# Patient Record
Sex: FEMALE | Race: WHITE | Employment: OTHER | URBAN - METROPOLITAN AREA
[De-identification: names, ages, dates, MRNs, and addresses within clinical notes are randomized per-mention and may not be internally consistent; named-entity substitution may affect disease eponyms.]

---

## 2017-01-04 ENCOUNTER — APPOINTMENT (OUTPATIENT)
Dept: LAB | Age: 72
End: 2017-01-04
Attending: INTERNAL MEDICINE
Payer: MEDICARE

## 2017-01-04 DIAGNOSIS — E03.9 HYPOTHYROIDISM, UNSPECIFIED TYPE: ICD-10-CM

## 2017-01-04 DIAGNOSIS — E55.9 VITAMIN D DEFICIENCY: ICD-10-CM

## 2017-01-04 LAB
T4 FREE SERPL-MCNC: 1.44 NG/DL (ref 0.58–1.64)
TSH SERPL-ACNC: 3.11 UIU/ML (ref 0.34–5.6)

## 2017-01-04 PROCEDURE — 36415 COLL VENOUS BLD VENIPUNCTURE: CPT

## 2017-01-04 PROCEDURE — 84443 ASSAY THYROID STIM HORMONE: CPT

## 2017-01-04 PROCEDURE — 84439 ASSAY OF FREE THYROXINE: CPT

## 2017-01-04 PROCEDURE — 82306 VITAMIN D 25 HYDROXY: CPT

## 2017-01-06 LAB — 25(OH)D3 SERPL-MCNC: 27.3 NG/ML

## 2017-01-10 ENCOUNTER — APPOINTMENT (OUTPATIENT)
Dept: LAB | Age: 72
End: 2017-01-10
Attending: FAMILY MEDICINE
Payer: MEDICARE

## 2017-01-10 PROCEDURE — 36415 COLL VENOUS BLD VENIPUNCTURE: CPT | Performed by: FAMILY MEDICINE

## 2017-01-10 PROCEDURE — 80053 COMPREHEN METABOLIC PANEL: CPT | Performed by: FAMILY MEDICINE

## 2017-01-10 PROCEDURE — 83036 HEMOGLOBIN GLYCOSYLATED A1C: CPT | Performed by: FAMILY MEDICINE

## 2017-01-10 PROCEDURE — 80061 LIPID PANEL: CPT | Performed by: FAMILY MEDICINE

## 2017-01-10 PROCEDURE — 84443 ASSAY THYROID STIM HORMONE: CPT | Performed by: FAMILY MEDICINE

## 2017-01-10 PROCEDURE — 82570 ASSAY OF URINE CREATININE: CPT | Performed by: FAMILY MEDICINE

## 2017-01-10 PROCEDURE — 82043 UR ALBUMIN QUANTITATIVE: CPT | Performed by: FAMILY MEDICINE

## 2017-01-12 ENCOUNTER — OFFICE VISIT (OUTPATIENT)
Dept: FAMILY MEDICINE CLINIC | Facility: CLINIC | Age: 72
End: 2017-01-12

## 2017-01-12 VITALS
DIASTOLIC BLOOD PRESSURE: 84 MMHG | SYSTOLIC BLOOD PRESSURE: 170 MMHG | WEIGHT: 166 LBS | BODY MASS INDEX: 27 KG/M2 | HEART RATE: 73 BPM

## 2017-01-12 DIAGNOSIS — E11.9 TYPE 2 DIABETES MELLITUS WITHOUT COMPLICATION, WITHOUT LONG-TERM CURRENT USE OF INSULIN (HCC): Primary | ICD-10-CM

## 2017-01-12 DIAGNOSIS — E78.5 HYPERLIPIDEMIA, UNSPECIFIED HYPERLIPIDEMIA TYPE: ICD-10-CM

## 2017-01-12 DIAGNOSIS — F32.A DEPRESSION, UNSPECIFIED DEPRESSION TYPE: ICD-10-CM

## 2017-01-12 PROCEDURE — 90732 PPSV23 VACC 2 YRS+ SUBQ/IM: CPT | Performed by: FAMILY MEDICINE

## 2017-01-12 PROCEDURE — G0463 HOSPITAL OUTPT CLINIC VISIT: HCPCS | Performed by: FAMILY MEDICINE

## 2017-01-12 PROCEDURE — 99214 OFFICE O/P EST MOD 30 MIN: CPT | Performed by: FAMILY MEDICINE

## 2017-01-12 PROCEDURE — G0009 ADMIN PNEUMOCOCCAL VACCINE: HCPCS | Performed by: FAMILY MEDICINE

## 2017-01-12 RX ORDER — ESCITALOPRAM OXALATE 10 MG/1
20 TABLET ORAL DAILY
Qty: 60 TABLET | Refills: 5 | Status: SHIPPED | OUTPATIENT
Start: 2017-01-12 | End: 2017-01-22

## 2017-01-12 RX ORDER — PRAVASTATIN SODIUM 80 MG/1
80 TABLET ORAL NIGHTLY
Qty: 90 TABLET | Refills: 3 | Status: SHIPPED | OUTPATIENT
Start: 2017-01-12 | End: 2017-12-15

## 2017-01-12 RX ORDER — CHOLECALCIFEROL (VITAMIN D3) 50 MCG
2000 TABLET ORAL DAILY
Qty: 90 TABLET | Refills: 3 | Status: SHIPPED | OUTPATIENT
Start: 2017-01-12 | End: 2017-02-11

## 2017-01-12 NOTE — PROGRESS NOTES
HPI:    Patient ID: Axel Mace is a 80year old female. Diabetes  She presents for her follow-up diabetic visit. She has type 2 diabetes mellitus. There are no hypoglycemic associated symptoms.  Pertinent negatives for diabetes include no blurred v monofilament testing bilateral feet   Skin: Skin is warm and dry.               ASSESSMENT/PLAN:   Type 2 diabetes mellitus without complication, without long-term current use of insulin (hcc)  (primary encounter diagnosis)  Hyperlipidemia, unspecified hype

## 2017-01-19 ENCOUNTER — PATIENT MESSAGE (OUTPATIENT)
Dept: FAMILY MEDICINE CLINIC | Facility: CLINIC | Age: 72
End: 2017-01-19

## 2017-01-21 NOTE — TELEPHONE ENCOUNTER
From: Ignacio Brown  To: Sterling Andrade MD  Sent: 1/19/2017 11:58 AM CST  Subject: Prescription Question    Hi Dr. Rama Chang: You were going to change my depression medicine but must have forgotten. Could you please call it in to [facility] at [phone]?  Than

## 2017-01-22 RX ORDER — ESCITALOPRAM OXALATE 20 MG/1
20 TABLET ORAL DAILY
Qty: 30 TABLET | Refills: 2 | Status: SHIPPED | OUTPATIENT
Start: 2017-01-22 | End: 2017-04-20

## 2017-03-15 ENCOUNTER — OFFICE VISIT (OUTPATIENT)
Dept: ENDOCRINOLOGY CLINIC | Facility: CLINIC | Age: 72
End: 2017-03-15

## 2017-03-15 ENCOUNTER — TELEPHONE (OUTPATIENT)
Dept: ENDOCRINOLOGY CLINIC | Facility: CLINIC | Age: 72
End: 2017-03-15

## 2017-03-15 VITALS
HEIGHT: 66 IN | HEART RATE: 85 BPM | BODY MASS INDEX: 26.39 KG/M2 | DIASTOLIC BLOOD PRESSURE: 80 MMHG | SYSTOLIC BLOOD PRESSURE: 142 MMHG | WEIGHT: 164.19 LBS

## 2017-03-15 DIAGNOSIS — E03.9 HYPOTHYROIDISM, UNSPECIFIED TYPE: Primary | ICD-10-CM

## 2017-03-15 DIAGNOSIS — M85.80 OSTEOPENIA, UNSPECIFIED LOCATION: ICD-10-CM

## 2017-03-15 PROCEDURE — 99213 OFFICE O/P EST LOW 20 MIN: CPT | Performed by: INTERNAL MEDICINE

## 2017-03-15 PROCEDURE — G0463 HOSPITAL OUTPT CLINIC VISIT: HCPCS | Performed by: INTERNAL MEDICINE

## 2017-03-15 NOTE — TELEPHONE ENCOUNTER
Pt called back and stated that the dosage she is taking is 112 mcg per their conversation this morning with Dr Marcela Guerrero.  Thank you        Current outpatient prescriptions:   •  Levothyroxine Sodium 112 MCG Oral Tab, Take 1 tablet (112 mcg total) by mouth

## 2017-03-15 NOTE — PROGRESS NOTES
Return Office Visit - Diabetes    CHIEF COMPLAINT:    Hypothyroidism  Osteopenia    HISTORY OF PRESENT ILLNESS:   Farrukh Escobar is a 80year old female who presents for follow up for hypothyroidism, osteopenia.      She was diagnosed with hyperthyroidism bleeding  Neurology: Negative for: headache, numbness, weakness  Genito-Urinary: Negative for: dysuria, frequency  Psychiatric: Negative for:  depression, anxiety  Hematology/Lymphatics: Negative for: bruising, lower extremity edema  Endocrine: Negative fo especially calcium, iron, multivitamins containing Ca/iron  Stressed the importance of compliance with meds  Side effects of noncompliance including the development of myxedema coma and death discussed    Bone health:  25 OH vitamin D is normal. She is on

## 2017-03-17 RX ORDER — LEVOTHYROXINE SODIUM 112 UG/1
112 TABLET ORAL
Qty: 180 TABLET | Refills: 1 | Status: SHIPPED | OUTPATIENT
Start: 2017-03-17 | End: 2018-09-04

## 2017-03-17 NOTE — TELEPHONE ENCOUNTER
Patient was confused at the visit. Hence I had asked her to call back. I filled for the 112 mcg daily.   Thx.

## 2017-03-20 RX ORDER — LEVOTHYROXINE SODIUM 112 UG/1
TABLET ORAL
Qty: 90 TABLET | Refills: 0 | Status: SHIPPED | OUTPATIENT
Start: 2017-03-20 | End: 2017-11-02

## 2017-04-20 RX ORDER — ESCITALOPRAM OXALATE 20 MG/1
TABLET ORAL
Qty: 30 TABLET | Refills: 11 | Status: SHIPPED | OUTPATIENT
Start: 2017-04-20 | End: 2018-03-24

## 2017-06-23 ENCOUNTER — PATIENT MESSAGE (OUTPATIENT)
Dept: FAMILY MEDICINE CLINIC | Facility: CLINIC | Age: 72
End: 2017-06-23

## 2017-06-24 NOTE — TELEPHONE ENCOUNTER
From: Chet Frost  To: Miko Stoner MD  Sent: 6/23/2017 7:33 PM CDT  Subject: Other    Dear Dr. Gissell Lockwood: I was due for a mammogram in May and I am not due to see you until July so could you send me an order for one, either by mail or e-mail? Thanks.

## 2017-06-25 ENCOUNTER — PATIENT MESSAGE (OUTPATIENT)
Dept: FAMILY MEDICINE CLINIC | Facility: CLINIC | Age: 72
End: 2017-06-25

## 2017-06-25 DIAGNOSIS — Z12.31 VISIT FOR SCREENING MAMMOGRAM: ICD-10-CM

## 2017-06-25 DIAGNOSIS — Z12.39 BREAST CANCER SCREENING, HIGH RISK PATIENT: Primary | ICD-10-CM

## 2017-06-27 NOTE — TELEPHONE ENCOUNTER
From: Susanne Gonzalez  To: Mauricio Reynolds MD  Sent: 6/25/2017 11:09 PM CDT  Subject: Non-Urgent Medical Question    Attn: Felicita Rios RN I get my mammograms at the [facility].  Would they be able to access this information from your compute

## 2017-06-27 NOTE — TELEPHONE ENCOUNTER
Please see [employer] message from 17 below. Patient's last Mammogram order  2017. LOV 17. Please advise.

## 2017-06-28 ENCOUNTER — PATIENT MESSAGE (OUTPATIENT)
Dept: FAMILY MEDICINE CLINIC | Facility: CLINIC | Age: 72
End: 2017-06-28

## 2017-06-29 NOTE — TELEPHONE ENCOUNTER
From: Melissa Quarles  To: Veronica Anderson MD  Sent: 6/28/2017 4:16 PM CDT  Subject: Other    Dear Dr. Miguel Hernandes, the mammogram order doesn't print. Could you please mail it to me at [address] [place] [address]? Thanks!  Zenaida Pretty

## 2017-07-15 ENCOUNTER — TELEPHONE (OUTPATIENT)
Dept: FAMILY MEDICINE CLINIC | Facility: CLINIC | Age: 72
End: 2017-07-15

## 2017-07-21 ENCOUNTER — PATIENT OUTREACH (OUTPATIENT)
Dept: FAMILY MEDICINE CLINIC | Facility: CLINIC | Age: 72
End: 2017-07-21

## 2017-07-21 DIAGNOSIS — E11.9 CONTROLLED TYPE 2 DIABETES MELLITUS WITHOUT COMPLICATION, WITHOUT LONG-TERM CURRENT USE OF INSULIN (HCC): Primary | ICD-10-CM

## 2017-07-21 NOTE — PROGRESS NOTES
Received request as CM to please order any labs prior to appt. Pt had slightly high Lipid panel and was due for another A1c. Both orders placed and pt informed she needs to be fasting with only water to drink 10-12 hrs prior to testing.  Stated understandin

## 2017-07-27 ENCOUNTER — APPOINTMENT (OUTPATIENT)
Dept: LAB | Age: 72
End: 2017-07-27
Attending: FAMILY MEDICINE
Payer: MEDICARE

## 2017-07-27 DIAGNOSIS — E11.9 CONTROLLED TYPE 2 DIABETES MELLITUS WITHOUT COMPLICATION, WITHOUT LONG-TERM CURRENT USE OF INSULIN (HCC): ICD-10-CM

## 2017-07-27 LAB
CHOLEST SERPL-MCNC: 196 MG/DL (ref 110–200)
HDLC SERPL-MCNC: 66 MG/DL
LDLC SERPL CALC-MCNC: 104 MG/DL (ref 0–99)
NONHDLC SERPL-MCNC: 130 MG/DL
TRIGL SERPL-MCNC: 128 MG/DL (ref 1–149)

## 2017-07-27 PROCEDURE — 36415 COLL VENOUS BLD VENIPUNCTURE: CPT

## 2017-07-27 PROCEDURE — 83036 HEMOGLOBIN GLYCOSYLATED A1C: CPT

## 2017-07-27 PROCEDURE — 80061 LIPID PANEL: CPT

## 2017-07-28 LAB — HBA1C MFR BLD: 5.7 % (ref 4–6)

## 2017-07-31 ENCOUNTER — OFFICE VISIT (OUTPATIENT)
Dept: FAMILY MEDICINE CLINIC | Facility: CLINIC | Age: 72
End: 2017-07-31

## 2017-07-31 VITALS
WEIGHT: 165 LBS | HEART RATE: 75 BPM | DIASTOLIC BLOOD PRESSURE: 74 MMHG | TEMPERATURE: 98 F | RESPIRATION RATE: 16 BRPM | SYSTOLIC BLOOD PRESSURE: 124 MMHG | BODY MASS INDEX: 30.36 KG/M2 | HEIGHT: 62 IN

## 2017-07-31 DIAGNOSIS — M79.10 MYALGIA: ICD-10-CM

## 2017-07-31 DIAGNOSIS — E78.5 HYPERLIPIDEMIA, UNSPECIFIED HYPERLIPIDEMIA TYPE: ICD-10-CM

## 2017-07-31 DIAGNOSIS — E11.9 CONTROLLED TYPE 2 DIABETES MELLITUS WITHOUT COMPLICATION, WITHOUT LONG-TERM CURRENT USE OF INSULIN (HCC): Primary | ICD-10-CM

## 2017-07-31 PROCEDURE — 99214 OFFICE O/P EST MOD 30 MIN: CPT | Performed by: FAMILY MEDICINE

## 2017-07-31 PROCEDURE — G0463 HOSPITAL OUTPT CLINIC VISIT: HCPCS | Performed by: FAMILY MEDICINE

## 2017-07-31 NOTE — PROGRESS NOTES
HPI:    Patient ID: Melissa Quarles is a 71year old female. Diabetes   She presents for her follow-up diabetic visit. She has type 2 diabetes mellitus. Her disease course has been stable. There are no hypoglycemic associated symptoms.  Pertinent negati Lymphadenopathy:     She has no cervical adenopathy. Neurological: She is alert and oriented to person, place, and time. Skin: Skin is warm and dry.               ASSESSMENT/PLAN:   Controlled type 2 diabetes mellitus without complication, without malean

## 2017-08-22 ENCOUNTER — PATIENT MESSAGE (OUTPATIENT)
Dept: FAMILY MEDICINE CLINIC | Facility: CLINIC | Age: 72
End: 2017-08-22

## 2017-08-26 ENCOUNTER — NURSE TRIAGE (OUTPATIENT)
Dept: OTHER | Age: 72
End: 2017-08-26

## 2017-08-26 DIAGNOSIS — M25.552 BILATERAL HIP PAIN: ICD-10-CM

## 2017-08-26 DIAGNOSIS — M79.652 BILATERAL THIGH PAIN: ICD-10-CM

## 2017-08-26 DIAGNOSIS — M79.10 PAIN IN THE MUSCLES: Primary | ICD-10-CM

## 2017-08-26 DIAGNOSIS — M79.651 BILATERAL THIGH PAIN: ICD-10-CM

## 2017-08-26 DIAGNOSIS — M25.551 BILATERAL HIP PAIN: ICD-10-CM

## 2017-08-26 NOTE — TELEPHONE ENCOUNTER
Please see patient E-mail and advise. 7/31/17 office notes:  Plan to hold pravastatin for 3 weeks at that time contact us with update.   If no change in pain and to check bilateral hip x-rays and MRI lumbar spine to rule out hip arthritis, lumbar stenos

## 2017-08-26 NOTE — TELEPHONE ENCOUNTER
From: Javi Clay  To:  July Escalona MD  Sent: 8/22/2017  1:04 PM CDT  Subject: Visit Follow-up Question    Dear Dr Manuel Horton: Andreea Bradshaw stopped taking my cholesterol medicine for 3 weeks to see if my muscle pain got better.  It got a little better bu

## 2017-08-26 NOTE — TELEPHONE ENCOUNTER
From: Anthony Hogan  To: Joelle Cobb MD  Sent: 8/22/2017 1:04 PM CDT  Subject: Visit Follow-up Question    Dear Dr Kaycee Cummings: I stopped taking my cholesterol medicine for 3 weeks to see if my muscle pain got better.  It got a little better but is still

## 2017-08-28 NOTE — TELEPHONE ENCOUNTER
Patient confirmed that she is still having the muscle pain even after stopping the pravastatin. Wanted to know next step? Did you want to go ahead with the x-ray and MRI? Please advise.

## 2017-08-28 NOTE — TELEPHONE ENCOUNTER
Advised patient of Dr. Annette Tobin note and number to scheduling given. Patient verbalized understanding.

## 2017-09-05 ENCOUNTER — HOSPITAL ENCOUNTER (OUTPATIENT)
Dept: MRI IMAGING | Age: 72
Discharge: HOME | End: 2017-09-05
Attending: FAMILY MEDICINE
Payer: MEDICARE

## 2017-09-05 ENCOUNTER — HOSPITAL ENCOUNTER (OUTPATIENT)
Dept: GENERAL RADIOLOGY | Age: 72
Discharge: HOME | End: 2017-09-05
Attending: FAMILY MEDICINE
Payer: MEDICARE

## 2017-09-05 DIAGNOSIS — M25.551 BILATERAL HIP PAIN: ICD-10-CM

## 2017-09-05 DIAGNOSIS — M79.10 PAIN IN THE MUSCLES: ICD-10-CM

## 2017-09-05 DIAGNOSIS — M25.552 BILATERAL HIP PAIN: ICD-10-CM

## 2017-09-05 DIAGNOSIS — M79.651 BILATERAL THIGH PAIN: ICD-10-CM

## 2017-09-05 DIAGNOSIS — M79.652 BILATERAL THIGH PAIN: ICD-10-CM

## 2017-09-05 PROCEDURE — 73523 X-RAY EXAM HIPS BI 5/> VIEWS: CPT | Performed by: FAMILY MEDICINE

## 2017-09-05 PROCEDURE — 72148 MRI LUMBAR SPINE W/O DYE: CPT | Performed by: FAMILY MEDICINE

## 2017-09-05 RX ORDER — METFORMIN HYDROCHLORIDE 500 MG/1
TABLET, EXTENDED RELEASE ORAL
Qty: 90 TABLET | Refills: 1 | Status: SHIPPED | OUTPATIENT
Start: 2017-09-05 | End: 2018-02-28

## 2017-09-05 NOTE — TELEPHONE ENCOUNTER
Diabetes Medications chart reviewed, RTC Jan 2018, refilled for 6 months  Protocol Criteria:  · Appointment scheduled in the past 6 months or the next 3 months  · A1C < 7.5 in the past 6 months  · Creatinine in the past 12 months  · Creatinine result < 1.5

## 2017-09-06 DIAGNOSIS — M76.899 HAMSTRING TENDINITIS AT ORIGIN: ICD-10-CM

## 2017-09-06 DIAGNOSIS — M48.061 LUMBAR STENOSIS: ICD-10-CM

## 2017-09-06 DIAGNOSIS — R93.7 ABNORMAL MRI, LUMBAR SPINE: Primary | ICD-10-CM

## 2017-09-14 ENCOUNTER — HOSPITAL ENCOUNTER (OUTPATIENT)
Dept: NUCLEAR MEDICINE | Facility: HOSPITAL | Age: 72
Discharge: HOME | End: 2017-09-14
Attending: FAMILY MEDICINE
Payer: MEDICARE

## 2017-09-14 DIAGNOSIS — R93.7 ABNORMAL MRI, LUMBAR SPINE: ICD-10-CM

## 2017-09-14 PROCEDURE — 78320 NM BONE SCAN WITH SPECT  (CPT=78306/78320): CPT | Performed by: FAMILY MEDICINE

## 2017-09-14 PROCEDURE — 78306 BONE IMAGING WHOLE BODY: CPT | Performed by: FAMILY MEDICINE

## 2017-09-18 ENCOUNTER — HOSPITAL ENCOUNTER (OUTPATIENT)
Dept: GENERAL RADIOLOGY | Age: 72
Discharge: HOME | End: 2017-09-18
Attending: FAMILY MEDICINE
Payer: MEDICARE

## 2017-09-18 DIAGNOSIS — R94.8 ABNORMAL RADIONUCLIDE BONE SCAN: ICD-10-CM

## 2017-09-18 PROCEDURE — 72070 X-RAY EXAM THORAC SPINE 2VWS: CPT | Performed by: FAMILY MEDICINE

## 2017-09-19 ENCOUNTER — TELEPHONE (OUTPATIENT)
Dept: FAMILY MEDICINE CLINIC | Facility: CLINIC | Age: 72
End: 2017-09-19

## 2017-09-19 NOTE — TELEPHONE ENCOUNTER
See 9/6 PT order- Pt requesting physical therapy  at [facility], [place]ria Jay, [place] Tracey  Requesting to mail order to her home address

## 2017-09-20 NOTE — TELEPHONE ENCOUNTER
Spoke with pt and informed pt that order was already placed and a copy will be mailed to her.  Pt voiced understanding

## 2017-10-31 ENCOUNTER — TELEPHONE (OUTPATIENT)
Dept: OTHER | Age: 72
End: 2017-10-31

## 2017-10-31 NOTE — TELEPHONE ENCOUNTER
Pt returned call, states she is receivng physical therapy for back pain, pain worsened last Thursday when pt was shopping. Pain is better today, still having problems getting up from a sitting position. Pt did not got to therapy yesterday.     Appt schedule

## 2017-10-31 NOTE — TELEPHONE ENCOUNTER
----- Message from Mushtaq Chaudhari sent at 10/30/2017  4:54 PM CDT -----  Regarding: Non-Urgent Medical Question  Contact: [phone]  Hi Dr. Mann Davies had about six weeks of  physical therapy and   felt much better.   Thursday I went shopping  an

## 2017-11-02 ENCOUNTER — APPOINTMENT (OUTPATIENT)
Dept: LAB | Age: 72
End: 2017-11-02
Attending: FAMILY MEDICINE
Payer: MEDICARE

## 2017-11-02 ENCOUNTER — OFFICE VISIT (OUTPATIENT)
Dept: FAMILY MEDICINE CLINIC | Facility: CLINIC | Age: 72
End: 2017-11-02

## 2017-11-02 VITALS
DIASTOLIC BLOOD PRESSURE: 72 MMHG | HEART RATE: 86 BPM | TEMPERATURE: 98 F | HEIGHT: 62 IN | WEIGHT: 166.19 LBS | SYSTOLIC BLOOD PRESSURE: 132 MMHG | RESPIRATION RATE: 17 BRPM | BODY MASS INDEX: 30.58 KG/M2

## 2017-11-02 DIAGNOSIS — M48.062 SPINAL STENOSIS OF LUMBAR REGION WITH NEUROGENIC CLAUDICATION: ICD-10-CM

## 2017-11-02 DIAGNOSIS — R26.9 GAIT ABNORMALITY: ICD-10-CM

## 2017-11-02 DIAGNOSIS — M79.10 MYALGIA: Primary | ICD-10-CM

## 2017-11-02 DIAGNOSIS — Z72.89 OTHER PROBLEMS RELATED TO LIFESTYLE: ICD-10-CM

## 2017-11-02 DIAGNOSIS — M79.10 MYALGIA: ICD-10-CM

## 2017-11-02 PROCEDURE — 86140 C-REACTIVE PROTEIN: CPT

## 2017-11-02 PROCEDURE — 36415 COLL VENOUS BLD VENIPUNCTURE: CPT

## 2017-11-02 PROCEDURE — 99214 OFFICE O/P EST MOD 30 MIN: CPT | Performed by: FAMILY MEDICINE

## 2017-11-02 PROCEDURE — 86803 HEPATITIS C AB TEST: CPT

## 2017-11-02 PROCEDURE — 85652 RBC SED RATE AUTOMATED: CPT

## 2017-11-02 PROCEDURE — 99212 OFFICE O/P EST SF 10 MIN: CPT | Performed by: FAMILY MEDICINE

## 2017-11-02 NOTE — PROGRESS NOTES
HPI:    Patient ID: Efrian Houston is a 71year old female. Gait   This is a new problem. The current episode started 1 to 4 weeks ago. The problem occurs intermittently. The problem has been waxing and waning. Associated symptoms include myalgias.  Pe lifestyle  Spinal stenosis of lumbar region with neurogenic claudication     Myalgias–patient complains of bilateral lower extremity pain, aching in buttocks and anterior thighs. Last week she had severe episode while shopping.   She had low back pain with

## 2017-12-04 ENCOUNTER — TELEPHONE (OUTPATIENT)
Dept: FAMILY MEDICINE CLINIC | Facility: CLINIC | Age: 72
End: 2017-12-04

## 2017-12-04 DIAGNOSIS — G91.2 NPH (NORMAL PRESSURE HYDROCEPHALUS) (HCC): Primary | ICD-10-CM

## 2017-12-04 NOTE — TELEPHONE ENCOUNTER
Contacted by Dr. Aline Whelan regarding possible normal pressure hydrocephalus. Patient had borderline ventricle enlargement on MRI. Diagnostic LP would be helpful except patient's symptoms are so intermittent that it would be unlikely to show improvement.   Sh

## 2017-12-15 RX ORDER — PRAVASTATIN SODIUM 80 MG/1
TABLET ORAL
Qty: 90 TABLET | Refills: 0 | Status: SHIPPED | OUTPATIENT
Start: 2017-12-15 | End: 2018-03-28

## 2017-12-21 ENCOUNTER — TELEPHONE (OUTPATIENT)
Dept: FAMILY MEDICINE CLINIC | Facility: CLINIC | Age: 72
End: 2017-12-21

## 2017-12-21 NOTE — TELEPHONE ENCOUNTER
Pt requesting a pre op physical prior to surgery on 1/9/18.  Please advise, thank you     Please reply to pool: RENEE Jeong

## 2017-12-22 NOTE — TELEPHONE ENCOUNTER
Pt calling checking status of call back pt requesting to be added for a pre op prior to surgery date on 01/09. Please advise.      Please reply to pool: RENEE Jeong

## 2017-12-25 ENCOUNTER — PATIENT MESSAGE (OUTPATIENT)
Dept: FAMILY MEDICINE CLINIC | Facility: CLINIC | Age: 72
End: 2017-12-25

## 2017-12-26 NOTE — TELEPHONE ENCOUNTER
Patient is having surgery on Jan 9 th - Need pre op apt asap. Only to see Dr Janet Donnelly, she had been her PCP for over 30 years.          PLEASE REPLY TO EM CALL CENTER

## 2017-12-26 NOTE — TELEPHONE ENCOUNTER
From: Kelton Delatorre  To: Thomas Taylor MD  Sent: 12/25/2017 7:20 PM CST  Subject: Other    Hi Dr. Sonia Hill: [facility] AT [facility] [employer] [facility] you had a great Von! I am having a shunt put in at [facility] on Jan. 9 and I need pre-op physical from you.  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

## 2017-12-27 NOTE — TELEPHONE ENCOUNTER
Pt contacted and given date of 1/2/2018 - this was the first 30 min appt I found that I could schedule pt even in an acute slot - do you think that will be ok with her surgery being on 1/9?

## 2018-01-02 ENCOUNTER — OFFICE VISIT (OUTPATIENT)
Dept: FAMILY MEDICINE CLINIC | Facility: CLINIC | Age: 73
End: 2018-01-02

## 2018-01-02 ENCOUNTER — LAB ENCOUNTER (OUTPATIENT)
Dept: LAB | Age: 73
End: 2018-01-02
Attending: FAMILY MEDICINE
Payer: MEDICARE

## 2018-01-02 VITALS
BODY MASS INDEX: 29.78 KG/M2 | RESPIRATION RATE: 18 BRPM | TEMPERATURE: 98 F | HEIGHT: 62 IN | WEIGHT: 161.81 LBS | DIASTOLIC BLOOD PRESSURE: 84 MMHG | HEART RATE: 80 BPM | SYSTOLIC BLOOD PRESSURE: 138 MMHG

## 2018-01-02 DIAGNOSIS — E11.9 TYPE 2 DIABETES MELLITUS WITHOUT COMPLICATION, WITHOUT LONG-TERM CURRENT USE OF INSULIN (HCC): ICD-10-CM

## 2018-01-02 DIAGNOSIS — Z01.818 PREOPERATIVE GENERAL PHYSICAL EXAMINATION: ICD-10-CM

## 2018-01-02 DIAGNOSIS — Z01.818 PREOPERATIVE GENERAL PHYSICAL EXAMINATION: Primary | ICD-10-CM

## 2018-01-02 DIAGNOSIS — G91.2 NPH (NORMAL PRESSURE HYDROCEPHALUS) (HCC): ICD-10-CM

## 2018-01-02 LAB
ALBUMIN SERPL BCP-MCNC: 4 G/DL (ref 3.5–4.8)
ALBUMIN/GLOB SERPL: 1.6 {RATIO} (ref 1–2)
ALP SERPL-CCNC: 52 U/L (ref 32–100)
ALT SERPL-CCNC: 35 U/L (ref 14–54)
ANION GAP SERPL CALC-SCNC: 8 MMOL/L (ref 0–18)
APTT PPP: 28.2 SECONDS (ref 23.2–35.3)
AST SERPL-CCNC: 24 U/L (ref 15–41)
BASOPHILS # BLD: 0 K/UL (ref 0–0.2)
BASOPHILS NFR BLD: 0 %
BILIRUB SERPL-MCNC: 1 MG/DL (ref 0.3–1.2)
BUN SERPL-MCNC: 18 MG/DL (ref 8–20)
BUN/CREAT SERPL: 28.6 (ref 10–20)
CALCIUM SERPL-MCNC: 9.7 MG/DL (ref 8.5–10.5)
CHLORIDE SERPL-SCNC: 109 MMOL/L (ref 95–110)
CHOLEST SERPL-MCNC: 202 MG/DL (ref 110–200)
CO2 SERPL-SCNC: 24 MMOL/L (ref 22–32)
CREAT SERPL-MCNC: 0.63 MG/DL (ref 0.5–1.5)
CREAT UR-MCNC: 161.3 MG/DL
EOSINOPHIL # BLD: 0.1 K/UL (ref 0–0.7)
EOSINOPHIL NFR BLD: 1 %
ERYTHROCYTE [DISTWIDTH] IN BLOOD BY AUTOMATED COUNT: 13.6 % (ref 11–15)
GLOBULIN PLAS-MCNC: 2.5 G/DL (ref 2.5–3.7)
GLUCOSE SERPL-MCNC: 108 MG/DL (ref 70–99)
HCT VFR BLD AUTO: 46.4 % (ref 35–48)
HDLC SERPL-MCNC: 70 MG/DL
HGB BLD-MCNC: 15.4 G/DL (ref 12–16)
INR BLD: 1 (ref 0.9–1.2)
LDLC SERPL CALC-MCNC: 111 MG/DL (ref 0–99)
LYMPHOCYTES # BLD: 1.6 K/UL (ref 1–4)
LYMPHOCYTES NFR BLD: 31 %
MCH RBC QN AUTO: 30.8 PG (ref 27–32)
MCHC RBC AUTO-ENTMCNC: 33.2 G/DL (ref 32–37)
MCV RBC AUTO: 92.8 FL (ref 80–100)
MICROALBUMIN UR-MCNC: 1.8 MG/DL (ref 0–1.8)
MICROALBUMIN/CREAT UR: 11.2 MG/G{CREAT} (ref 0–20)
MONOCYTES # BLD: 0.4 K/UL (ref 0–1)
MONOCYTES NFR BLD: 8 %
NEUTROPHILS # BLD AUTO: 3 K/UL (ref 1.8–7.7)
NEUTROPHILS NFR BLD: 60 %
NONHDLC SERPL-MCNC: 132 MG/DL
OSMOLALITY UR CALC.SUM OF ELEC: 294 MOSM/KG (ref 275–295)
PLATELET # BLD AUTO: 206 K/UL (ref 140–400)
PMV BLD AUTO: 7.5 FL (ref 7.4–10.3)
POTASSIUM SERPL-SCNC: 3.9 MMOL/L (ref 3.3–5.1)
PROT SERPL-MCNC: 6.5 G/DL (ref 5.9–8.4)
PROTHROMBIN TIME: 13.2 SECONDS (ref 11.8–14.5)
RBC # BLD AUTO: 5 M/UL (ref 3.7–5.4)
SODIUM SERPL-SCNC: 141 MMOL/L (ref 136–144)
TRIGL SERPL-MCNC: 107 MG/DL (ref 1–149)
TSH SERPL-ACNC: 2.35 UIU/ML (ref 0.45–5.33)
WBC # BLD AUTO: 5.1 K/UL (ref 4–11)

## 2018-01-02 PROCEDURE — 80053 COMPREHEN METABOLIC PANEL: CPT

## 2018-01-02 PROCEDURE — 93010 ELECTROCARDIOGRAM REPORT: CPT | Performed by: FAMILY MEDICINE

## 2018-01-02 PROCEDURE — 99214 OFFICE O/P EST MOD 30 MIN: CPT | Performed by: FAMILY MEDICINE

## 2018-01-02 PROCEDURE — 93005 ELECTROCARDIOGRAM TRACING: CPT | Performed by: FAMILY MEDICINE

## 2018-01-02 PROCEDURE — 82043 UR ALBUMIN QUANTITATIVE: CPT

## 2018-01-02 PROCEDURE — 85610 PROTHROMBIN TIME: CPT

## 2018-01-02 PROCEDURE — 80061 LIPID PANEL: CPT

## 2018-01-02 PROCEDURE — 36415 COLL VENOUS BLD VENIPUNCTURE: CPT

## 2018-01-02 PROCEDURE — G0463 HOSPITAL OUTPT CLINIC VISIT: HCPCS | Performed by: FAMILY MEDICINE

## 2018-01-02 PROCEDURE — 85025 COMPLETE CBC W/AUTO DIFF WBC: CPT

## 2018-01-02 PROCEDURE — 85730 THROMBOPLASTIN TIME PARTIAL: CPT

## 2018-01-02 PROCEDURE — 83036 HEMOGLOBIN GLYCOSYLATED A1C: CPT

## 2018-01-02 PROCEDURE — 84443 ASSAY THYROID STIM HORMONE: CPT

## 2018-01-02 PROCEDURE — 82570 ASSAY OF URINE CREATININE: CPT

## 2018-01-02 NOTE — PROGRESS NOTES
HPI:    Patient ID: Cecilia Ahmadi is a 71year old female. patient is scheduled for placement of the CSF shunt at [facility] 1/9/18 with Dr. Ruchi Saavedra        Review of Systems   Constitutional: Positive for fatigue.    Respiratory: pressure hydrocephalus)  Type 2 diabetes mellitus without complication, without long-term current use of insulin (hcc)     Preoperative physical exam–patient is scheduled for placement of the CSF shunt at [facility] 1/9/18 with Dr. David Carson And

## 2018-01-03 LAB — HBA1C MFR BLD: 5.7 % (ref 4–6)

## 2018-01-08 ENCOUNTER — TELEPHONE (OUTPATIENT)
Dept: FAMILY MEDICINE CLINIC | Facility: CLINIC | Age: 73
End: 2018-01-08

## 2018-01-08 NOTE — TELEPHONE ENCOUNTER
Pt calling states she is scheduled for surgery on 01/09 and needs clearance and results of lab work sent to [facility] fax .

## 2018-01-30 ENCOUNTER — OFFICE VISIT (OUTPATIENT)
Dept: FAMILY MEDICINE CLINIC | Facility: CLINIC | Age: 73
End: 2018-01-30

## 2018-01-30 VITALS
WEIGHT: 162.19 LBS | RESPIRATION RATE: 17 BRPM | TEMPERATURE: 98 F | HEIGHT: 62 IN | HEART RATE: 78 BPM | DIASTOLIC BLOOD PRESSURE: 82 MMHG | SYSTOLIC BLOOD PRESSURE: 132 MMHG | BODY MASS INDEX: 29.85 KG/M2

## 2018-01-30 DIAGNOSIS — G91.2 NPH (NORMAL PRESSURE HYDROCEPHALUS) (HCC): ICD-10-CM

## 2018-01-30 DIAGNOSIS — E11.9 CONTROLLED TYPE 2 DIABETES MELLITUS WITHOUT COMPLICATION, WITHOUT LONG-TERM CURRENT USE OF INSULIN (HCC): Primary | ICD-10-CM

## 2018-01-30 PROCEDURE — G0463 HOSPITAL OUTPT CLINIC VISIT: HCPCS | Performed by: FAMILY MEDICINE

## 2018-01-30 PROCEDURE — 99213 OFFICE O/P EST LOW 20 MIN: CPT | Performed by: FAMILY MEDICINE

## 2018-01-30 NOTE — PROGRESS NOTES
HPI:    Patient ID: Amanda Herrera is a 71year old female. Diabetes   She presents for her follow-up diabetic visit. She has type 2 diabetes mellitus. Her disease course has been stable. There are no hypoglycemic associated symptoms.  There are no jesse metformin. Recent hemoglobin A1c 5.9. Plan to continue current medication follow-up here for Medicare physical in 4-5 months. NPH– 3 weeks status post  shunt with Dr. Cy Camargo at [facility].   Patient has noticed improvement in leg weakness, inco

## 2018-02-27 ENCOUNTER — TELEPHONE (OUTPATIENT)
Dept: ENDOCRINOLOGY CLINIC | Facility: CLINIC | Age: 73
End: 2018-02-27

## 2018-02-27 DIAGNOSIS — E03.9 HYPOTHYROIDISM, UNSPECIFIED TYPE: Primary | ICD-10-CM

## 2018-02-27 NOTE — TELEPHONE ENCOUNTER
No labs currently ordered by Dr. Kristie Ceja. Homero Escobedo. Informed her that Dr. Kristie Ceja is on vacation and we will call her back when MD returns.  Patient states she had a TSH on 1/2 ordered by Dr. Abhinav Shirley that was normal. She was unsure if she needed anyt

## 2018-02-28 RX ORDER — METFORMIN HYDROCHLORIDE 500 MG/1
TABLET, EXTENDED RELEASE ORAL
Qty: 90 TABLET | Refills: 3 | Status: SHIPPED | OUTPATIENT
Start: 2018-02-28 | End: 2019-02-15

## 2018-03-05 NOTE — TELEPHONE ENCOUNTER
Patient returned call. Informed her FT4 order placed and patient will go for labs before upcoming appt.

## 2018-03-12 ENCOUNTER — PATIENT MESSAGE (OUTPATIENT)
Dept: FAMILY MEDICINE CLINIC | Facility: CLINIC | Age: 73
End: 2018-03-12

## 2018-03-12 RX ORDER — LOSARTAN POTASSIUM AND HYDROCHLOROTHIAZIDE 12.5; 5 MG/1; MG/1
1 TABLET ORAL DAILY
Qty: 30 TABLET | Refills: 3 | Status: SHIPPED | OUTPATIENT
Start: 2018-03-12 | End: 2018-07-04

## 2018-03-12 NOTE — TELEPHONE ENCOUNTER
From: Woo Ray  To: Maritza Oconnell MD  Sent: 3/12/2018 3:51 PM CDT  Subject: Non-Urgent Medical Question    I have been checking my blood pressure like you asked me to and here are the results.  Please let me know if you think I should take any med

## 2018-03-15 ENCOUNTER — APPOINTMENT (OUTPATIENT)
Dept: LAB | Age: 73
End: 2018-03-15
Attending: INTERNAL MEDICINE
Payer: MEDICARE

## 2018-03-15 DIAGNOSIS — E03.9 HYPOTHYROIDISM, UNSPECIFIED TYPE: ICD-10-CM

## 2018-03-15 LAB — T4 FREE SERPL-MCNC: 1.13 NG/DL (ref 0.58–1.64)

## 2018-03-15 PROCEDURE — 36415 COLL VENOUS BLD VENIPUNCTURE: CPT

## 2018-03-15 PROCEDURE — 84439 ASSAY OF FREE THYROXINE: CPT

## 2018-03-21 ENCOUNTER — OFFICE VISIT (OUTPATIENT)
Dept: ENDOCRINOLOGY CLINIC | Facility: CLINIC | Age: 73
End: 2018-03-21

## 2018-03-21 ENCOUNTER — APPOINTMENT (OUTPATIENT)
Dept: LAB | Age: 73
End: 2018-03-21
Attending: INTERNAL MEDICINE
Payer: MEDICARE

## 2018-03-21 VITALS
BODY MASS INDEX: 30.36 KG/M2 | WEIGHT: 165 LBS | HEART RATE: 80 BPM | DIASTOLIC BLOOD PRESSURE: 84 MMHG | SYSTOLIC BLOOD PRESSURE: 137 MMHG | HEIGHT: 62 IN

## 2018-03-21 DIAGNOSIS — E55.9 VITAMIN D DEFICIENCY: ICD-10-CM

## 2018-03-21 DIAGNOSIS — M81.8 OTHER OSTEOPOROSIS, UNSPECIFIED PATHOLOGICAL FRACTURE PRESENCE: Primary | ICD-10-CM

## 2018-03-21 DIAGNOSIS — E03.9 HYPOTHYROIDISM, UNSPECIFIED TYPE: ICD-10-CM

## 2018-03-21 PROBLEM — E03.8 OTHER SPECIFIED HYPOTHYROIDISM: Status: ACTIVE | Noted: 2018-03-21

## 2018-03-21 PROCEDURE — 36415 COLL VENOUS BLD VENIPUNCTURE: CPT

## 2018-03-21 PROCEDURE — 82306 VITAMIN D 25 HYDROXY: CPT

## 2018-03-21 PROCEDURE — 99213 OFFICE O/P EST LOW 20 MIN: CPT | Performed by: INTERNAL MEDICINE

## 2018-03-21 PROCEDURE — G0463 HOSPITAL OUTPT CLINIC VISIT: HCPCS | Performed by: INTERNAL MEDICINE

## 2018-03-21 RX ORDER — MULTIVIT-MIN/IRON/FOLIC ACID/K 18-600-40
2000 CAPSULE ORAL
COMMUNITY

## 2018-03-21 NOTE — PROGRESS NOTES
Return Office Visit - Diabetes    CHIEF COMPLAINT:    Hypothyroidism  Osteopenia    HISTORY OF PRESENT ILLNESS:   Sumanth Armendariz is a 71year old female who presents for follow up for hypothyroidism, osteopenia.      She was diagnosed with hyperthyroidism dysphonia  Respiratory: Negative for:  dyspnea, cough  Cardiovascular: Negative for:  chest pain, palpitations, orthopnea  GI: Negative for:  abdominal pain, nausea, vomiting, diarrhea, constipation, bleeding  Neurology: Negative for: headache, numbness, w especially calcium, iron, multivitamins containing Ca/iron  Stressed the importance of compliance with meds  Side effects of noncompliance including the development of myxedema coma and death discussed    Bone health:  25 OH vitamin D is normal. She is on

## 2018-03-23 LAB — 25(OH)D3 SERPL-MCNC: 36.5 NG/ML

## 2018-03-24 RX ORDER — ESCITALOPRAM OXALATE 20 MG/1
TABLET ORAL
Qty: 30 TABLET | Refills: 11 | Status: SHIPPED | OUTPATIENT
Start: 2018-03-24 | End: 2019-01-10

## 2018-03-24 NOTE — TELEPHONE ENCOUNTER
Refill Protocol Appointment Criteria  · Appointment scheduled in the past 6 months or in the next 3 months  Recent Outpatient Visits            3 days ago Other osteoporosis, unspecified pathological fracture presence    [facility], [facility], [address] [place] [address]

## 2018-03-26 ENCOUNTER — PATIENT MESSAGE (OUTPATIENT)
Dept: ENDOCRINOLOGY CLINIC | Facility: CLINIC | Age: 73
End: 2018-03-26

## 2018-03-29 RX ORDER — PRAVASTATIN SODIUM 80 MG/1
TABLET ORAL
Qty: 90 TABLET | Refills: 0 | Status: SHIPPED | OUTPATIENT
Start: 2018-03-29 | End: 2018-06-24

## 2018-06-05 RX ORDER — LEVOTHYROXINE SODIUM 112 UG/1
TABLET ORAL
Qty: 90 TABLET | Refills: 0 | Status: SHIPPED | OUTPATIENT
Start: 2018-06-05 | End: 2018-07-10

## 2018-06-11 ENCOUNTER — TELEPHONE (OUTPATIENT)
Dept: FAMILY MEDICINE CLINIC | Facility: CLINIC | Age: 73
End: 2018-06-11

## 2018-06-11 DIAGNOSIS — E11.9 TYPE 2 DIABETES MELLITUS WITHOUT COMPLICATION, WITHOUT LONG-TERM CURRENT USE OF INSULIN (HCC): Primary | ICD-10-CM

## 2018-06-11 NOTE — TELEPHONE ENCOUNTER
Pt is calling requesting lab orders for her medicare wellness visit pt state that she would like to get done before her appt   Please advise

## 2018-06-13 NOTE — TELEPHONE ENCOUNTER
Dr. Kaycee Cummings pt has upcoming Medicare, wants to get labs. I pended some labs you have previously ordered. Can you take a look and make adjustments, dx code as well? Thanks!

## 2018-06-15 ENCOUNTER — APPOINTMENT (OUTPATIENT)
Dept: LAB | Age: 73
End: 2018-06-15
Attending: FAMILY MEDICINE
Payer: MEDICARE

## 2018-06-15 DIAGNOSIS — E11.9 TYPE 2 DIABETES MELLITUS WITHOUT COMPLICATION, WITHOUT LONG-TERM CURRENT USE OF INSULIN (HCC): ICD-10-CM

## 2018-06-15 PROCEDURE — 82570 ASSAY OF URINE CREATININE: CPT

## 2018-06-15 PROCEDURE — 80061 LIPID PANEL: CPT

## 2018-06-15 PROCEDURE — 83036 HEMOGLOBIN GLYCOSYLATED A1C: CPT

## 2018-06-15 PROCEDURE — 80053 COMPREHEN METABOLIC PANEL: CPT

## 2018-06-15 PROCEDURE — 36415 COLL VENOUS BLD VENIPUNCTURE: CPT

## 2018-06-15 PROCEDURE — 84443 ASSAY THYROID STIM HORMONE: CPT

## 2018-06-15 PROCEDURE — 82043 UR ALBUMIN QUANTITATIVE: CPT

## 2018-06-20 ENCOUNTER — TELEPHONE (OUTPATIENT)
Dept: ENDOCRINOLOGY CLINIC | Facility: CLINIC | Age: 73
End: 2018-06-20

## 2018-06-20 DIAGNOSIS — M81.8 OTHER OSTEOPOROSIS, UNSPECIFIED PATHOLOGICAL FRACTURE PRESENCE: Primary | ICD-10-CM

## 2018-06-20 NOTE — TELEPHONE ENCOUNTER
Re-ordered DEXA per AM written  faxed per message below and notified patient.   Patient verbalized understanding

## 2018-06-20 NOTE — TELEPHONE ENCOUNTER
Pt states that she tried to schedule bone density test at [facility] in [facility] but they would not accept order that was dated 3/21/18. She states they only accept an order within 90 days of issue date.   [facility]

## 2018-06-24 RX ORDER — PRAVASTATIN SODIUM 80 MG/1
TABLET ORAL
Qty: 90 TABLET | Refills: 0 | Status: SHIPPED | OUTPATIENT
Start: 2018-06-24 | End: 2018-07-10

## 2018-06-25 NOTE — TELEPHONE ENCOUNTER
Cholesterol Medications  Protocol Criteria:  · Appointment scheduled in the past 12 months or in the next 3 months  · ALT & LDL on file in the past 12 months  · ALT result < 80  · LDL result <130   Recent Outpatient Visits            3 months ago Other ost

## 2018-07-05 NOTE — TELEPHONE ENCOUNTER
Please advise on refill request.     Hypertensive Medications  Protocol Criteria:  · Appointment scheduled in the past 6 months or in the next 3 months  · BMP or CMP in the past 12 months  · Creatinine result < 2  Recent Outpatient Visits            3 janay

## 2018-07-06 RX ORDER — LOSARTAN POTASSIUM AND HYDROCHLOROTHIAZIDE 12.5; 5 MG/1; MG/1
1 TABLET ORAL DAILY
Qty: 30 TABLET | Refills: 3 | Status: SHIPPED | OUTPATIENT
Start: 2018-07-06 | End: 2018-09-03

## 2018-07-09 ENCOUNTER — PATIENT MESSAGE (OUTPATIENT)
Dept: ENDOCRINOLOGY CLINIC | Facility: CLINIC | Age: 73
End: 2018-07-09

## 2018-07-10 ENCOUNTER — OFFICE VISIT (OUTPATIENT)
Dept: FAMILY MEDICINE CLINIC | Facility: CLINIC | Age: 73
End: 2018-07-10

## 2018-07-10 ENCOUNTER — APPOINTMENT (OUTPATIENT)
Dept: LAB | Age: 73
End: 2018-07-10
Attending: FAMILY MEDICINE
Payer: MEDICARE

## 2018-07-10 VITALS
DIASTOLIC BLOOD PRESSURE: 71 MMHG | BODY MASS INDEX: 29.81 KG/M2 | TEMPERATURE: 98 F | HEART RATE: 89 BPM | RESPIRATION RATE: 16 BRPM | WEIGHT: 162 LBS | SYSTOLIC BLOOD PRESSURE: 118 MMHG | HEIGHT: 62 IN

## 2018-07-10 DIAGNOSIS — Z00.00 ENCOUNTER FOR ANNUAL HEALTH EXAMINATION: ICD-10-CM

## 2018-07-10 DIAGNOSIS — E78.5 HYPERLIPIDEMIA, UNSPECIFIED HYPERLIPIDEMIA TYPE: ICD-10-CM

## 2018-07-10 DIAGNOSIS — G91.2 NORMAL PRESSURE HYDROCEPHALUS (HCC): ICD-10-CM

## 2018-07-10 DIAGNOSIS — E03.9 HYPOTHYROIDISM, UNSPECIFIED TYPE: ICD-10-CM

## 2018-07-10 DIAGNOSIS — M79.604 BILATERAL LEG PAIN: ICD-10-CM

## 2018-07-10 DIAGNOSIS — Z12.31 ENCOUNTER FOR SCREENING MAMMOGRAM FOR BREAST CANCER: ICD-10-CM

## 2018-07-10 DIAGNOSIS — M79.605 BILATERAL LEG PAIN: ICD-10-CM

## 2018-07-10 DIAGNOSIS — E11.9 TYPE 2 DIABETES MELLITUS WITHOUT COMPLICATION, WITHOUT LONG-TERM CURRENT USE OF INSULIN (HCC): ICD-10-CM

## 2018-07-10 DIAGNOSIS — K63.5 POLYP OF COLON, UNSPECIFIED PART OF COLON, UNSPECIFIED TYPE: ICD-10-CM

## 2018-07-10 DIAGNOSIS — Z00.00 ROUTINE PHYSICAL EXAMINATION: Primary | ICD-10-CM

## 2018-07-10 DIAGNOSIS — F32.A DEPRESSION, UNSPECIFIED DEPRESSION TYPE: ICD-10-CM

## 2018-07-10 DIAGNOSIS — M85.80 OSTEOPENIA, UNSPECIFIED LOCATION: ICD-10-CM

## 2018-07-10 LAB
ERYTHROCYTE [SEDIMENTATION RATE] IN BLOOD: 5 MM/HR (ref 0–30)
T4 FREE SERPL-MCNC: 1.47 NG/DL (ref 0.58–1.64)
TSH SERPL-ACNC: 0.05 UIU/ML (ref 0.45–5.33)

## 2018-07-10 PROCEDURE — 36415 COLL VENOUS BLD VENIPUNCTURE: CPT

## 2018-07-10 PROCEDURE — G0439 PPPS, SUBSEQ VISIT: HCPCS | Performed by: FAMILY MEDICINE

## 2018-07-10 PROCEDURE — 84443 ASSAY THYROID STIM HORMONE: CPT

## 2018-07-10 PROCEDURE — 85652 RBC SED RATE AUTOMATED: CPT

## 2018-07-10 PROCEDURE — 84439 ASSAY OF FREE THYROXINE: CPT

## 2018-07-10 RX ORDER — ATORVASTATIN CALCIUM 40 MG/1
40 TABLET, FILM COATED ORAL NIGHTLY
COMMUNITY
End: 2018-09-05

## 2018-07-10 NOTE — PROGRESS NOTES
HPI:    Patient ID: Kalyan Freitas is a 68year old female. HPI    Review of Systems         Current Outpatient Prescriptions:  atorvastatin 40 MG Oral Tab Take 40 mg by mouth nightly.  Disp:  Rfl:    LOSARTAN POTASSIUM-HCTZ 50-12.5 MG Oral Tab TAKE 1 disease. Plan to continue current dose     hypothyroidism– recently slightly decreased TSH. Repeat today    Normal pressure hydrocephalus–6 shunt. Months status post she completed physical therapy, had neurosurgery follow-up.   They wish to see her again

## 2018-07-11 DIAGNOSIS — E03.9 HYPOTHYROIDISM, ACQUIRED: Primary | ICD-10-CM

## 2018-07-11 RX ORDER — LEVOTHYROXINE SODIUM 0.1 MG/1
100 TABLET ORAL DAILY
Qty: 90 TABLET | Refills: 3 | Status: SHIPPED | OUTPATIENT
Start: 2018-07-11 | End: 2018-10-03

## 2018-07-11 NOTE — PATIENT INSTRUCTIONS
Dex Donahue SCREENING SCHEDULE   Tests on this list are recommended by your physician but may not be covered, or covered at this frequency, by your insurer. Please check with your insurance carrier before scheduling to verify coverage.    KEIRA • Men who are 73-68 years old and have smoked more than 100 cigarettes in their lifetime   • Anyone with a family history    Colorectal Cancer Screening  Covered up to Age 76     Colonoscopy Screen   Covered every 10 years- more often if abnormal Colonos No orders found for this or any previous visit.  Please get every year    Pneumococcal 13 (Prevnar)  Covered Once after 65   Orders placed or performed in visit on 01/12/15  -PNEUMOCOCCAL VACC, 13 SAYRA IM    Please get once after your 65th birthday    Melissa Modi

## 2018-07-11 NOTE — PROGRESS NOTES
HPI:   Yunior Ogden is a 68year old female who presents for a Medicare Subsequent Annual Wellness visit (Pt already had Initial Annual Wellness).     Generally well  Her last annual assessment has been over 1 year: Annual Physical due on 06/29/1947 was screened for Alcohol abuse and had a score of 0 so is at low risk.     Patient Care Team: Patient Care Team:  Mauricio Reynolds MD as PCP - General (Family Medicine)    Patient Active Problem List:     [facility] ([facility]     Hyperlipidemia Hypothyroidism; Microscopic hematuria (1999); Migraine; and Other and unspecified hyperlipidemia. She  has a past surgical history that includes colonoscopy & polypectomy (05/29/2008) and replace/revise brain shunt (01/2018).     Her family history inclu activities because I cannot hear well and fear I will reply improperly:  No   Family members and friends have told me they think I may have hearing loss:   No              Visual Acuity                           General Appearance:  Alert, cooperative, no d OTHER RELEVANT CHRONIC CONDITIONS:   Mitch Hernadez is a 68year old female who presents for a Medicare Assessment.      PLAN SUMMARY:   Diagnoses and all orders for this visit:    Routine physical examination    Encounter for screening mammogram for hi bilateral leg pain from thighs to ankles with walking. No muscle tenderness, dorsalis pedis pulses excellent. Mild difficulty rising from seated position. Moderate spinal stenosis noted on MRI 9/2017.   Suspect likely spinal stenosis but will referred to previous visit. No flowsheet data found. Fecal Occult Blood Annually No results found for: FOB No flowsheet data found.     Glaucoma Screening      Ophthalmology Visit Annually: Diabetics, FHx Glaucoma, AA>50, > 65 Data entered on: 7/31/2017   L Internal Lab or Procedure External Lab or Procedure      Annual Monitoring of Persistent     Medications (ACE/ARB, digoxin diuretics, anticonvulsants.)    Potassium  Annually Potassium (mmol/L)   Date Value   06/15/2018 3.8     POTASSIUM (P) (mmol/L)   Zia

## 2018-09-03 RX ORDER — LOSARTAN POTASSIUM AND HYDROCHLOROTHIAZIDE 12.5; 5 MG/1; MG/1
1 TABLET ORAL DAILY
Qty: 30 TABLET | Refills: 3 | Status: SHIPPED | OUTPATIENT
Start: 2018-09-03 | End: 2019-02-22

## 2018-09-04 RX ORDER — LEVOTHYROXINE SODIUM 112 UG/1
TABLET ORAL
Qty: 90 TABLET | Refills: 0 | Status: SHIPPED | OUTPATIENT
Start: 2018-09-04 | End: 2018-09-05

## 2018-09-04 NOTE — TELEPHONE ENCOUNTER
Hypertensive Medications  Protocol Criteria:  · Appointment scheduled in the past 6 months or in the next 3 months  · BMP or CMP in the past 12 months  · Creatinine result < 2  Recent Outpatient Visits            1 month ago Routine physical examination Sig: TAKE 1 TABLET BY MOUTH DAILY. Refill approved per protocol.     LR 7-6-18 # 30 + 3

## 2018-09-05 ENCOUNTER — TELEPHONE (OUTPATIENT)
Dept: NEUROLOGY | Facility: CLINIC | Age: 73
End: 2018-09-05

## 2018-09-05 ENCOUNTER — HOSPITAL ENCOUNTER (OUTPATIENT)
Dept: GENERAL RADIOLOGY | Age: 73
Discharge: HOME | End: 2018-09-05
Attending: PHYSICAL MEDICINE & REHABILITATION
Payer: MEDICARE

## 2018-09-05 ENCOUNTER — OFFICE VISIT (OUTPATIENT)
Dept: NEUROLOGY | Facility: CLINIC | Age: 73
End: 2018-09-05
Payer: MEDICARE

## 2018-09-05 VITALS
HEIGHT: 66 IN | WEIGHT: 160 LBS | RESPIRATION RATE: 16 BRPM | DIASTOLIC BLOOD PRESSURE: 70 MMHG | BODY MASS INDEX: 25.71 KG/M2 | HEART RATE: 88 BPM | SYSTOLIC BLOOD PRESSURE: 130 MMHG

## 2018-09-05 DIAGNOSIS — M54.50 CHRONIC RIGHT-SIDED LOW BACK PAIN WITHOUT SCIATICA: Primary | ICD-10-CM

## 2018-09-05 DIAGNOSIS — M51.9 LUMBAR DISC DISEASE: ICD-10-CM

## 2018-09-05 DIAGNOSIS — M51.26 LUMBAR HERNIATED DISC: ICD-10-CM

## 2018-09-05 DIAGNOSIS — M54.50 CHRONIC RIGHT-SIDED LOW BACK PAIN WITHOUT SCIATICA: ICD-10-CM

## 2018-09-05 DIAGNOSIS — M54.16 LUMBAR RADICULOPATHY: ICD-10-CM

## 2018-09-05 DIAGNOSIS — G89.29 CHRONIC RIGHT-SIDED LOW BACK PAIN WITHOUT SCIATICA: Primary | ICD-10-CM

## 2018-09-05 DIAGNOSIS — G89.29 CHRONIC RIGHT-SIDED LOW BACK PAIN WITHOUT SCIATICA: ICD-10-CM

## 2018-09-05 DIAGNOSIS — M43.16 SPONDYLOLISTHESIS OF LUMBAR REGION: ICD-10-CM

## 2018-09-05 DIAGNOSIS — M48.061 SPINAL STENOSIS OF LUMBAR REGION WITHOUT NEUROGENIC CLAUDICATION: ICD-10-CM

## 2018-09-05 PROCEDURE — 99204 OFFICE O/P NEW MOD 45 MIN: CPT | Performed by: PHYSICAL MEDICINE & REHABILITATION

## 2018-09-05 PROCEDURE — 72110 X-RAY EXAM L-2 SPINE 4/>VWS: CPT | Performed by: PHYSICAL MEDICINE & REHABILITATION

## 2018-09-05 RX ORDER — ATORVASTATIN CALCIUM 80 MG/1
80 TABLET, FILM COATED ORAL DAILY
COMMUNITY
End: 2019-08-19

## 2018-09-05 NOTE — PATIENT INSTRUCTIONS
As of October 6th 2014, the Drug Enforcement Agency [facility]) is reclassifying all hydrocodone combination medications from Schedule III to Schedule II. This includes medications such as Norco, Vicodin, Lortab, Zohydro, and Vicoprofen.     What this means for y will get lumbar spine flexion and extension x-rays. I will do a right L5 TFESI. If she is doing better after the injection, then I will have her start PT on her lumbar spine again. She will go to [facility] in [facility].     She will use the ibuprof

## 2018-09-05 NOTE — PROGRESS NOTES
Low Back Pain H & P    Chief Complaint:  Patient presents with:  Leg Pain: Patient presents for bilateral leg pain and occasional low back pain, has had for past 2 years.  States she has spinal stenosis, done PT in the past with some relief for her back but disease     s/p WALKER Miko hypothyroid   • Hypothyroidism     s/p WALKER   Dr Angelina Shine   • Microscopic hematuria 1999   • Migraine    • Other and unspecified hyperlipidemia     hypercholesterol       Past Surgical History   Past Surgical History:  05/29/2008: 3.  The patient has a normal affect and mood. Respiratory:  No acute respiratory distress. Patient does not have a cough. HEENT:  Extraocular muscles are intact. There is no kern icterus. Pupils are equal, round, and reactive to light.  No redness o rotation Negative for pain   RIGHT hip piriformis stretch test Negative for pain   LEFT hip piriformis stretch test Negative for pain     Neural Tension Tests Lumbar Spine:  Sitting straight leg raise-RIGHT Positive for right posterior leg pain   Sitting s

## 2018-09-05 NOTE — TELEPHONE ENCOUNTER
Patient has been scheduled for a L5(L5-S1) TFESI under local at [address]  on 9/21/2018 at the [address] Medications and allergies reviewed. Patient informed to hold aspirins, nsaids, blood thinners, vitamins and fish oils 3-7 days prior to procedure.  Patient infor

## 2018-09-11 ENCOUNTER — TELEPHONE (OUTPATIENT)
Dept: FAMILY MEDICINE CLINIC | Facility: CLINIC | Age: 73
End: 2018-09-11

## 2018-09-11 NOTE — TELEPHONE ENCOUNTER
Yes, okay to stop Metformin prior to injection. However this is only necessary if contrast is used with the imaging. Check with physician doing the injection to see whether aspirin needs to be discontinued 1 week prior.   Usually this is not necessary wit

## 2018-09-11 NOTE — TELEPHONE ENCOUNTER
Patient has corisone shot in back scheduled for 09/21/2018 and needs to know if can stop Metformin 3-7 days prior. Also needs to know if needs to stop any other medication.      Response can be sent via [employer] per patient

## 2018-09-11 NOTE — TELEPHONE ENCOUNTER
Advised patient of Dr Thomas's note. Patient verbalized understanding  Patient states she was told to stop the aspirin one week prior to the aspirin. Patient will check with the physician doing the injection about whether contrast will be used.

## 2018-09-17 ENCOUNTER — APPOINTMENT (OUTPATIENT)
Dept: LAB | Age: 73
End: 2018-09-17
Attending: FAMILY MEDICINE
Payer: MEDICARE

## 2018-09-17 DIAGNOSIS — E03.9 HYPOTHYROIDISM, ACQUIRED: ICD-10-CM

## 2018-09-17 LAB — TSH SERPL-ACNC: 0.11 UIU/ML (ref 0.45–5.33)

## 2018-09-17 PROCEDURE — 84443 ASSAY THYROID STIM HORMONE: CPT

## 2018-09-17 PROCEDURE — 36415 COLL VENOUS BLD VENIPUNCTURE: CPT

## 2018-09-18 DIAGNOSIS — E03.9 ACQUIRED HYPOTHYROIDISM: Primary | ICD-10-CM

## 2018-09-18 RX ORDER — LEVOTHYROXINE SODIUM 88 UG/1
88 TABLET ORAL
Qty: 90 TABLET | Refills: 0 | Status: SHIPPED | OUTPATIENT
Start: 2018-09-18 | End: 2018-12-04

## 2018-09-21 ENCOUNTER — OFFICE VISIT (OUTPATIENT)
Dept: SURGERY | Facility: CLINIC | Age: 73
End: 2018-09-21
Payer: MEDICARE

## 2018-09-21 DIAGNOSIS — M54.16 LUMBAR RADICULOPATHY: Primary | ICD-10-CM

## 2018-09-21 DIAGNOSIS — M48.061 SPINAL STENOSIS OF LUMBAR REGION WITHOUT NEUROGENIC CLAUDICATION: ICD-10-CM

## 2018-09-21 DIAGNOSIS — M51.9 LUMBAR DISC DISEASE: ICD-10-CM

## 2018-09-21 PROCEDURE — 64483 NJX AA&/STRD TFRM EPI L/S 1: CPT | Performed by: PHYSICAL MEDICINE & REHABILITATION

## 2018-09-21 NOTE — PROCEDURES
[facility] [address]    LUMBAR TRANSFORAMINAL   NAME:  Mitch Hernadez    MR #:    [id number] :  1945     PHYSICIAN:  Valeria Chand        Operative Report    DATE OF PROCEDURE: 2018   PREOPERATIVE DIAGNOSES: 1. right L5 radicu cc solution of 2 cc of 40 mg/cc of Kenalog and 2 cc of 1% PF lidocaine without epinephrine. After this, the needle was removed. The patient's skin was cleaned. A Band-Aid was applied. The patient was transferred to the cart and into [facility].   The patient w

## 2018-09-28 RX ORDER — PRAVASTATIN SODIUM 80 MG/1
TABLET ORAL
Qty: 90 TABLET | Refills: 3 | Status: SHIPPED | OUTPATIENT
Start: 2018-09-28 | End: 2019-10-08 | Stop reason: ALTCHOICE

## 2018-10-01 ENCOUNTER — TELEPHONE (OUTPATIENT)
Dept: NEUROLOGY | Facility: CLINIC | Age: 73
End: 2018-10-01

## 2018-10-01 NOTE — TELEPHONE ENCOUNTER
Pt calling with condition update after Right L5 TFESI done 9/21/18. Pt has been feeling better after injection with occasional left knee pain/weakness. Pt is \"walking ok but once in a while feels like the knee may give out\".  Takes ibuprofen PRN but has n

## 2018-10-03 ENCOUNTER — OFFICE VISIT (OUTPATIENT)
Dept: NEUROLOGY | Facility: CLINIC | Age: 73
End: 2018-10-03
Payer: MEDICARE

## 2018-10-03 ENCOUNTER — HOSPITAL ENCOUNTER (OUTPATIENT)
Dept: GENERAL RADIOLOGY | Age: 73
Discharge: HOME | End: 2018-10-03
Attending: PHYSICAL MEDICINE & REHABILITATION
Payer: MEDICARE

## 2018-10-03 VITALS
HEIGHT: 66.5 IN | HEART RATE: 80 BPM | SYSTOLIC BLOOD PRESSURE: 110 MMHG | BODY MASS INDEX: 25.41 KG/M2 | RESPIRATION RATE: 20 BRPM | DIASTOLIC BLOOD PRESSURE: 72 MMHG | WEIGHT: 160 LBS

## 2018-10-03 DIAGNOSIS — M51.9 LUMBAR DISC DISEASE: ICD-10-CM

## 2018-10-03 DIAGNOSIS — M43.16 SPONDYLOLISTHESIS OF LUMBAR REGION: ICD-10-CM

## 2018-10-03 DIAGNOSIS — M54.16 LUMBAR RADICULOPATHY: Primary | ICD-10-CM

## 2018-10-03 DIAGNOSIS — M51.26 LUMBAR HERNIATED DISC: ICD-10-CM

## 2018-10-03 DIAGNOSIS — M25.561 ACUTE PAIN OF RIGHT KNEE: ICD-10-CM

## 2018-10-03 DIAGNOSIS — M48.061 SPINAL STENOSIS OF LUMBAR REGION WITHOUT NEUROGENIC CLAUDICATION: ICD-10-CM

## 2018-10-03 PROCEDURE — 73562 X-RAY EXAM OF KNEE 3: CPT | Performed by: PHYSICAL MEDICINE & REHABILITATION

## 2018-10-03 PROCEDURE — 99214 OFFICE O/P EST MOD 30 MIN: CPT | Performed by: PHYSICAL MEDICINE & REHABILITATION

## 2018-10-03 NOTE — PROGRESS NOTES
Low Back Pain H & P    Chief Complaint: Patient presents with:  Knee Pain: Patient presents for right knee pain since monday. Walking increases pain, has not tried any pain meds for the pain. Rated pain a 3/10 when walking.        Patient was last seen on 9 non-medical: Not on file    Occupational History      Not on file    Tobacco Use      Smoking status: Never Smoker      Smokeless tobacco: Never Used    Substance and Sexual Activity      Alcohol use: Yes        Comment: on occasion       Drug use:  No plantar reflexes: downward response   Reflexes: 2+ in bilateral lower extremities     Knee  Inspection: No ecchymosis, no erythema, no swelling   Palpation: Not warm, non tender   ROM: Extension full   Tests: No medial laxity, lateral laxity, anterior draw

## 2018-10-03 NOTE — PATIENT INSTRUCTIONS
Plan  She will get a right knee x-ray. The patient does not need any pain medications at this time. She will do PT on the lumbar spine and the right knee.     She will call me once she has had the imaging studies and I will review them and my office

## 2018-10-04 ENCOUNTER — TELEPHONE (OUTPATIENT)
Dept: NEUROLOGY | Facility: CLINIC | Age: 73
End: 2018-10-04

## 2018-10-06 PROBLEM — M17.11 PRIMARY OSTEOARTHRITIS OF RIGHT KNEE: Status: ACTIVE | Noted: 2018-10-06

## 2018-10-17 ENCOUNTER — MED REC SCAN ONLY (OUTPATIENT)
Dept: NEUROLOGY | Facility: CLINIC | Age: 73
End: 2018-10-17

## 2018-10-17 ENCOUNTER — TELEPHONE (OUTPATIENT)
Dept: FAMILY MEDICINE CLINIC | Facility: CLINIC | Age: 73
End: 2018-10-17

## 2018-11-09 ENCOUNTER — MED REC SCAN ONLY (OUTPATIENT)
Dept: NEUROLOGY | Facility: CLINIC | Age: 73
End: 2018-11-09

## 2018-12-03 ENCOUNTER — APPOINTMENT (OUTPATIENT)
Dept: LAB | Age: 73
End: 2018-12-03
Attending: FAMILY MEDICINE
Payer: MEDICARE

## 2018-12-03 DIAGNOSIS — E03.9 ACQUIRED HYPOTHYROIDISM: ICD-10-CM

## 2018-12-03 PROCEDURE — 84443 ASSAY THYROID STIM HORMONE: CPT

## 2018-12-03 PROCEDURE — 36415 COLL VENOUS BLD VENIPUNCTURE: CPT

## 2018-12-04 RX ORDER — LEVOTHYROXINE SODIUM 88 UG/1
88 TABLET ORAL
Qty: 90 TABLET | Refills: 3 | Status: SHIPPED | OUTPATIENT
Start: 2018-12-04 | End: 2019-11-21

## 2018-12-05 ENCOUNTER — OFFICE VISIT (OUTPATIENT)
Dept: NEUROLOGY | Facility: CLINIC | Age: 73
End: 2018-12-05
Payer: MEDICARE

## 2018-12-05 VITALS
BODY MASS INDEX: 25.71 KG/M2 | SYSTOLIC BLOOD PRESSURE: 116 MMHG | DIASTOLIC BLOOD PRESSURE: 66 MMHG | RESPIRATION RATE: 16 BRPM | HEIGHT: 66 IN | WEIGHT: 160 LBS | HEART RATE: 72 BPM

## 2018-12-05 DIAGNOSIS — M51.9 LUMBAR DISC DISEASE: ICD-10-CM

## 2018-12-05 DIAGNOSIS — M51.26 LUMBAR HERNIATED DISC: ICD-10-CM

## 2018-12-05 DIAGNOSIS — M43.16 SPONDYLOLISTHESIS OF LUMBAR REGION: ICD-10-CM

## 2018-12-05 DIAGNOSIS — M17.11 PRIMARY OSTEOARTHRITIS OF RIGHT KNEE: ICD-10-CM

## 2018-12-05 DIAGNOSIS — M48.061 SPINAL STENOSIS OF LUMBAR REGION WITHOUT NEUROGENIC CLAUDICATION: ICD-10-CM

## 2018-12-05 DIAGNOSIS — M54.16 LUMBAR RADICULOPATHY: Primary | ICD-10-CM

## 2018-12-05 PROCEDURE — 99214 OFFICE O/P EST MOD 30 MIN: CPT | Performed by: PHYSICAL MEDICINE & REHABILITATION

## 2018-12-05 NOTE — PROGRESS NOTES
Low Back Pain H & P    Chief Complaint: Patient presents with:  Knee Pain: Patient presents for follow up on right knee pain, LOV: 10/3/18.  States her pain has improved since last office visit, had x-ray of right knee done 10/3/18, has done PT and states i Financial resource strain: Not on file      Food insecurity - worry: Not on file      Food insecurity - inability: Not on file      Transportation needs - medical: Not on file      Transportation needs - non-medical: Not on file    Occupational History Palpation:    Spinous Processes: Non-tender for all Spinous Processes   Z-joints: Non-tender for all Z-joints   SIJ: Non-tender for bilateral SIJ   Piriformis Muscle: Non-tender bilateral Piriformis muscles   Greater Trochanteric Bursa: Non-tender for bila afterward. The patient understands and agrees with the stated plan. Addison Paniagua MD  12/5/2018

## 2018-12-05 NOTE — PATIENT INSTRUCTIONS
Plan  The patient will continue with her home exercise program and walking with the ankle weights for now. The patient does not need any pain medications at this time.     The patient does not need any injections at this time either for her knee or her

## 2019-01-10 RX ORDER — ESCITALOPRAM OXALATE 20 MG/1
TABLET ORAL
Qty: 30 TABLET | Refills: 3 | Status: SHIPPED | OUTPATIENT
Start: 2019-01-10 | End: 2019-04-11

## 2019-01-15 ENCOUNTER — APPOINTMENT (OUTPATIENT)
Dept: LAB | Age: 74
End: 2019-01-15
Attending: FAMILY MEDICINE
Payer: MEDICARE

## 2019-01-15 ENCOUNTER — OFFICE VISIT (OUTPATIENT)
Dept: FAMILY MEDICINE CLINIC | Facility: CLINIC | Age: 74
End: 2019-01-15
Payer: MEDICARE

## 2019-01-15 VITALS
RESPIRATION RATE: 18 BRPM | SYSTOLIC BLOOD PRESSURE: 118 MMHG | BODY MASS INDEX: 27 KG/M2 | DIASTOLIC BLOOD PRESSURE: 78 MMHG | TEMPERATURE: 98 F | WEIGHT: 168 LBS | HEART RATE: 84 BPM

## 2019-01-15 DIAGNOSIS — M85.80 OSTEOPENIA, UNSPECIFIED LOCATION: ICD-10-CM

## 2019-01-15 DIAGNOSIS — E11.9 TYPE 2 DIABETES MELLITUS WITHOUT COMPLICATION, WITHOUT LONG-TERM CURRENT USE OF INSULIN (HCC): ICD-10-CM

## 2019-01-15 DIAGNOSIS — E11.9 TYPE 2 DIABETES MELLITUS WITHOUT COMPLICATION, WITHOUT LONG-TERM CURRENT USE OF INSULIN (HCC): Primary | ICD-10-CM

## 2019-01-15 DIAGNOSIS — M79.604 BILATERAL LEG PAIN: ICD-10-CM

## 2019-01-15 DIAGNOSIS — M79.605 BILATERAL LEG PAIN: ICD-10-CM

## 2019-01-15 LAB
EST. AVERAGE GLUCOSE BLD GHB EST-MCNC: 120 MG/DL (ref 68–126)
HBA1C MFR BLD HPLC: 5.8 % (ref ?–5.7)

## 2019-01-15 PROCEDURE — 36415 COLL VENOUS BLD VENIPUNCTURE: CPT

## 2019-01-15 PROCEDURE — 99213 OFFICE O/P EST LOW 20 MIN: CPT | Performed by: FAMILY MEDICINE

## 2019-01-15 PROCEDURE — G0463 HOSPITAL OUTPT CLINIC VISIT: HCPCS | Performed by: FAMILY MEDICINE

## 2019-01-15 PROCEDURE — 83036 HEMOGLOBIN GLYCOSYLATED A1C: CPT

## 2019-01-15 NOTE — PROGRESS NOTES
HPI:    Patient ID: Yolette Agrawal is a 68year old female. Diabetes   She presents for her follow-up diabetic visit. She has type 2 diabetes mellitus. Her disease course has been stable. There are no hypoglycemic associated symptoms.  Pertinent negati Mouth/Throat: Oropharynx is clear and moist.   Eyes: Pupils are equal, round, and reactive to light. Neck: Neck supple. Cardiovascular: Normal rate, regular rhythm and normal heart sounds. No murmur heard.   Pulmonary/Chest: Effort normal and breath

## 2019-02-11 ENCOUNTER — MED REC SCAN ONLY (OUTPATIENT)
Dept: NEUROLOGY | Facility: CLINIC | Age: 74
End: 2019-02-11

## 2019-02-15 RX ORDER — METFORMIN HYDROCHLORIDE 500 MG/1
TABLET, EXTENDED RELEASE ORAL
Qty: 90 TABLET | Refills: 3 | Status: SHIPPED | OUTPATIENT
Start: 2019-02-15 | End: 2020-01-28

## 2019-02-15 NOTE — TELEPHONE ENCOUNTER
Yuvonne Manifold Yuvonne Manifold Yuvonne Manifold ................... Yuvonne Manifold   Diabetes Medications  Protocol Criteria:  · Appointment scheduled in the past 6 months or the next 3 months  · A1C < 7.5 in the past 6 months  · Creatinine in the past 12 months  · Creatinine result < 1.5   Recent Outpatient Visits

## 2019-02-22 RX ORDER — LOSARTAN POTASSIUM AND HYDROCHLOROTHIAZIDE 12.5; 5 MG/1; MG/1
1 TABLET ORAL DAILY
Qty: 30 TABLET | Refills: 11 | Status: SHIPPED | OUTPATIENT
Start: 2019-02-22 | End: 2019-09-16

## 2019-04-11 RX ORDER — ESCITALOPRAM OXALATE 20 MG/1
TABLET ORAL
Qty: 90 TABLET | Refills: 1 | Status: SHIPPED | OUTPATIENT
Start: 2019-04-11 | End: 2019-10-07

## 2019-08-09 ENCOUNTER — APPOINTMENT (OUTPATIENT)
Dept: LAB | Age: 74
End: 2019-08-09
Attending: FAMILY MEDICINE
Payer: MEDICARE

## 2019-08-09 DIAGNOSIS — E11.9 TYPE 2 DIABETES MELLITUS WITHOUT COMPLICATION, WITHOUT LONG-TERM CURRENT USE OF INSULIN (HCC): Primary | ICD-10-CM

## 2019-08-09 DIAGNOSIS — E11.9 TYPE 2 DIABETES MELLITUS WITHOUT COMPLICATION, WITHOUT LONG-TERM CURRENT USE OF INSULIN (HCC): ICD-10-CM

## 2019-08-09 LAB
ALBUMIN SERPL-MCNC: 3.6 G/DL (ref 3.4–5)
ALBUMIN/GLOB SERPL: 1.1 {RATIO} (ref 1–2)
ALP LIVER SERPL-CCNC: 55 U/L (ref 55–142)
ALT SERPL-CCNC: 43 U/L (ref 13–56)
ANION GAP SERPL CALC-SCNC: 7 MMOL/L (ref 0–18)
AST SERPL-CCNC: 19 U/L (ref 15–37)
BILIRUB SERPL-MCNC: 0.7 MG/DL (ref 0.1–2)
BUN BLD-MCNC: 25 MG/DL (ref 7–18)
BUN/CREAT SERPL: 33.8 (ref 10–20)
CALCIUM BLD-MCNC: 9.1 MG/DL (ref 8.5–10.1)
CHLORIDE SERPL-SCNC: 109 MMOL/L (ref 98–112)
CHOLEST SMN-MCNC: 177 MG/DL (ref ?–200)
CO2 SERPL-SCNC: 27 MMOL/L (ref 21–32)
CREAT BLD-MCNC: 0.74 MG/DL (ref 0.55–1.02)
CREAT UR-SCNC: 181 MG/DL
EST. AVERAGE GLUCOSE BLD GHB EST-MCNC: 131 MG/DL (ref 68–126)
GLOBULIN PLAS-MCNC: 3.2 G/DL (ref 2.8–4.4)
GLUCOSE BLD-MCNC: 136 MG/DL (ref 70–99)
HBA1C MFR BLD HPLC: 6.2 % (ref ?–5.7)
HDLC SERPL-MCNC: 64 MG/DL (ref 40–59)
LDLC SERPL CALC-MCNC: 94 MG/DL (ref ?–100)
M PROTEIN MFR SERPL ELPH: 6.8 G/DL (ref 6.4–8.2)
MICROALBUMIN UR-MCNC: 1.8 MG/DL
MICROALBUMIN/CREAT 24H UR-RTO: 9.9 UG/MG (ref ?–30)
NONHDLC SERPL-MCNC: 113 MG/DL (ref ?–130)
OSMOLALITY SERPL CALC.SUM OF ELEC: 302 MOSM/KG (ref 275–295)
PATIENT FASTING: YES
PATIENT FASTING: YES
POTASSIUM SERPL-SCNC: 3.8 MMOL/L (ref 3.5–5.1)
SODIUM SERPL-SCNC: 143 MMOL/L (ref 136–145)
TRIGL SERPL-MCNC: 93 MG/DL (ref 30–149)
TSI SER-ACNC: 1.64 MIU/ML (ref 0.36–3.74)
VLDLC SERPL CALC-MCNC: 19 MG/DL (ref 0–30)

## 2019-08-09 PROCEDURE — 83036 HEMOGLOBIN GLYCOSYLATED A1C: CPT

## 2019-08-09 PROCEDURE — 84443 ASSAY THYROID STIM HORMONE: CPT

## 2019-08-09 PROCEDURE — 36415 COLL VENOUS BLD VENIPUNCTURE: CPT

## 2019-08-09 PROCEDURE — 82570 ASSAY OF URINE CREATININE: CPT

## 2019-08-09 PROCEDURE — 80053 COMPREHEN METABOLIC PANEL: CPT

## 2019-08-09 PROCEDURE — 82043 UR ALBUMIN QUANTITATIVE: CPT

## 2019-08-09 PROCEDURE — 80061 LIPID PANEL: CPT

## 2019-08-19 ENCOUNTER — OFFICE VISIT (OUTPATIENT)
Dept: FAMILY MEDICINE CLINIC | Facility: CLINIC | Age: 74
End: 2019-08-19
Payer: MEDICARE

## 2019-08-19 VITALS
BODY MASS INDEX: 27 KG/M2 | HEIGHT: 65.75 IN | RESPIRATION RATE: 18 BRPM | DIASTOLIC BLOOD PRESSURE: 76 MMHG | HEART RATE: 61 BPM | WEIGHT: 166 LBS | SYSTOLIC BLOOD PRESSURE: 128 MMHG | TEMPERATURE: 98 F

## 2019-08-19 DIAGNOSIS — Z12.31 ENCOUNTER FOR SCREENING MAMMOGRAM FOR BREAST CANCER: ICD-10-CM

## 2019-08-19 DIAGNOSIS — F33.41 RECURRENT MAJOR DEPRESSIVE DISORDER, IN PARTIAL REMISSION (HCC): ICD-10-CM

## 2019-08-19 DIAGNOSIS — M54.50 CHRONIC RIGHT-SIDED LOW BACK PAIN WITHOUT SCIATICA: ICD-10-CM

## 2019-08-19 DIAGNOSIS — E78.5 HYPERLIPIDEMIA, UNSPECIFIED HYPERLIPIDEMIA TYPE: ICD-10-CM

## 2019-08-19 DIAGNOSIS — G89.29 CHRONIC RIGHT-SIDED LOW BACK PAIN WITHOUT SCIATICA: ICD-10-CM

## 2019-08-19 DIAGNOSIS — G91.2 NORMAL PRESSURE HYDROCEPHALUS (HCC): ICD-10-CM

## 2019-08-19 DIAGNOSIS — R05.9 COUGH: ICD-10-CM

## 2019-08-19 DIAGNOSIS — M85.80 OSTEOPENIA, UNSPECIFIED LOCATION: ICD-10-CM

## 2019-08-19 DIAGNOSIS — E89.0 POSTABLATIVE HYPOTHYROIDISM: ICD-10-CM

## 2019-08-19 DIAGNOSIS — M17.11 PRIMARY OSTEOARTHRITIS OF RIGHT KNEE: ICD-10-CM

## 2019-08-19 DIAGNOSIS — Z00.00 ROUTINE PHYSICAL EXAMINATION: Primary | ICD-10-CM

## 2019-08-19 DIAGNOSIS — L98.9 SKIN LESION OF LEFT LEG: ICD-10-CM

## 2019-08-19 DIAGNOSIS — Z00.00 ENCOUNTER FOR ANNUAL HEALTH EXAMINATION: ICD-10-CM

## 2019-08-19 DIAGNOSIS — K63.5 POLYP OF COLON, UNSPECIFIED PART OF COLON, UNSPECIFIED TYPE: ICD-10-CM

## 2019-08-19 DIAGNOSIS — E11.9 CONTROLLED TYPE 2 DIABETES MELLITUS WITHOUT COMPLICATION, WITHOUT LONG-TERM CURRENT USE OF INSULIN (HCC): ICD-10-CM

## 2019-08-19 PROBLEM — M51.9 LUMBAR DISC DISEASE: Status: RESOLVED | Noted: 2018-09-05 | Resolved: 2019-08-19

## 2019-08-19 PROBLEM — M51.26 LUMBAR HERNIATED DISC: Status: RESOLVED | Noted: 2018-09-05 | Resolved: 2019-08-19

## 2019-08-19 PROBLEM — M54.16 LUMBAR RADICULOPATHY: Status: RESOLVED | Noted: 2018-09-05 | Resolved: 2019-08-19

## 2019-08-19 PROBLEM — M25.561 ACUTE PAIN OF RIGHT KNEE: Status: RESOLVED | Noted: 2018-10-03 | Resolved: 2019-08-19

## 2019-08-19 PROBLEM — M43.16 SPONDYLOLISTHESIS OF LUMBAR REGION: Status: RESOLVED | Noted: 2018-09-05 | Resolved: 2019-08-19

## 2019-08-19 PROBLEM — M48.061 SPINAL STENOSIS OF LUMBAR REGION WITHOUT NEUROGENIC CLAUDICATION: Status: RESOLVED | Noted: 2018-09-05 | Resolved: 2019-08-19

## 2019-08-19 PROCEDURE — G0439 PPPS, SUBSEQ VISIT: HCPCS | Performed by: FAMILY MEDICINE

## 2019-08-19 NOTE — PROGRESS NOTES
HPI:   Denys Morales is a 69year old female who presents for a Medicare Subsequent Annual Wellness visit (Pt already had Initial Annual Wellness).     Generally well  Her last annual assessment has been over 1 year: Annual Physical due on 07/10/2019 joint and patellar OA    Wt Readings from Last 3 Encounters:  08/19/19 : 166 lb (75.3 kg)  01/15/19 : 168 lb (76.2 kg)  12/05/18 : 160 lb (72.6 kg)     Last Cholesterol Labs:   Lab Results   Component Value Date    CHOLEST 177 08/09/2019    HDL 64 (H) 08/0 radiculopathy (9/5/2018), Microscopic hematuria (1999), Migraine, and Other and unspecified hyperlipidemia. She  has a past surgical history that includes colonoscopy & polypectomy (05/29/2008) and replace/revise brain shunt (01/2018).     Her family his normal, no murmur, rub, or gallop   Abdomen:   Soft, non-tender, bowel sounds active all four quadrants,  no masses, no organomegaly   Pelvic: Deferred   Extremities: Extremities normal, atraumatic, no cyanosis or edema   Pulses: 2+ and symmetric   Skin: S levothyroxine    Controlled type 2 diabetes mellitus without complication, without long-term current use of insulin (HCC)–stable on metformin    Normal pressure hydrocephalus (HCC)–18 months status post shunt placement. No complaints.     Recurrent major d Cholesterol (mg/dL)   Date Value   08/09/2019 94        EKG - w/ Initial Preventative Physical Exam only, or if medically necessary Electrocardiogram date01/02/2018       Colorectal Cancer Screening      Colonoscopy Screen every 10 years Colonoscopy due on Toxoid  Only covered with a cut with metal- TD and TDaP Not covered by Medicare Part B No vaccine history found This may be covered with your prescription benefits, but Medicare does not cover unless Medically needed    Zoster  Not covered by Medicare Part

## 2019-08-19 NOTE — PATIENT INSTRUCTIONS
Laura Sas SCREENING SCHEDULE   Tests on this list are recommended by your physician but may not be covered, or covered at this frequency, by your insurer. Please check with your insurance carrier before scheduling to verify coverage.    KEIRA Covered up to Age 76     Colonoscopy Screen   Covered every 10 years- more often if abnormal Colonoscopy due on 09/06/2023 Update Health Maintenance if applicable    Flex Sigmoidoscopy Screen  Covered every 5 years No results found for this or any previous 01/12/15   • PNEUMOCOCCAL VACC, 13 SAYRA IM    Please get once after your 65th birthday    Pneumococcal 23 (Pneumovax)  Covered Once after 65 Orders placed or performed in visit on 01/12/17   • PNEUMOCOCCAL IMM (PNEUMOVAX)    Please get once after your 65th nostril once a day for 2 weeks. If still no improvement please monitor cough (time of day, relationship to meals or activity) and follow-up with me.

## 2019-09-02 ENCOUNTER — TELEPHONE (OUTPATIENT)
Dept: FAMILY MEDICINE CLINIC | Facility: CLINIC | Age: 74
End: 2019-09-02

## 2019-09-03 NOTE — TELEPHONE ENCOUNTER
Patient states dry cough no different since LOV. Patient states she's not taken her blood pressure, nor has tried Zyrtec or Flonase. Patient denies wanting f/u OV, and just wanting to inform Dr. Humberto Wright.

## 2019-09-03 NOTE — TELEPHONE ENCOUNTER
Please instruct patient to go ahead and restart losartan/HCT. Try Zyrtec 10 mg daily and Flonase 2 puffs each nostril daily. Give me an update in 2 to 3 weeks.

## 2019-09-03 NOTE — TELEPHONE ENCOUNTER
Informed patient of Dr. Corrina Hussein advice/recommendations. Pt voiced understanding and agrees. Has OV with Dr. Miguel Hernandes 9/16.

## 2019-09-06 ENCOUNTER — TELEPHONE (OUTPATIENT)
Dept: FAMILY MEDICINE CLINIC | Facility: CLINIC | Age: 74
End: 2019-09-06

## 2019-09-16 ENCOUNTER — OFFICE VISIT (OUTPATIENT)
Dept: FAMILY MEDICINE CLINIC | Facility: CLINIC | Age: 74
End: 2019-09-16
Payer: MEDICARE

## 2019-09-16 VITALS
DIASTOLIC BLOOD PRESSURE: 82 MMHG | TEMPERATURE: 98 F | HEART RATE: 96 BPM | SYSTOLIC BLOOD PRESSURE: 132 MMHG | RESPIRATION RATE: 18 BRPM

## 2019-09-16 DIAGNOSIS — L98.9 SKIN LESION OF LEFT LEG: ICD-10-CM

## 2019-09-16 DIAGNOSIS — R26.89 IMBALANCE: ICD-10-CM

## 2019-09-16 DIAGNOSIS — I10 ESSENTIAL HYPERTENSION: ICD-10-CM

## 2019-09-16 DIAGNOSIS — G91.2 NORMAL PRESSURE HYDROCEPHALUS (HCC): Primary | ICD-10-CM

## 2019-09-16 PROCEDURE — 99213 OFFICE O/P EST LOW 20 MIN: CPT | Performed by: FAMILY MEDICINE

## 2019-09-16 RX ORDER — LOSARTAN POTASSIUM 50 MG/1
50 TABLET ORAL DAILY
Qty: 90 TABLET | Refills: 3 | Status: SHIPPED | OUTPATIENT
Start: 2019-09-16 | End: 2021-03-18 | Stop reason: ALTCHOICE

## 2019-09-16 RX ORDER — HYDROCHLOROTHIAZIDE 12.5 MG/1
12.5 TABLET ORAL DAILY
Qty: 90 TABLET | Refills: 3 | Status: SHIPPED | OUTPATIENT
Start: 2019-09-16 | End: 2020-08-31

## 2019-09-16 NOTE — PROGRESS NOTES
HPI:    Patient ID: Yue Swan is a 69year old female. Gait   This is a new problem. The current episode started today. The problem occurs constantly. The problem has been unchanged.  Pertinent negatives include no fever, headaches, numbness or we placement 2018. Today after awakening she has noted imbalance, often listing to the side. Denies vertigo. Referred to [facility] ER. Imbalance–as above    Essential hypertension– losartan/HCT not available.   We will replace with separate Rx    Skin ritchie

## 2019-10-08 ENCOUNTER — OFFICE VISIT (OUTPATIENT)
Dept: FAMILY MEDICINE CLINIC | Facility: CLINIC | Age: 74
End: 2019-10-08
Payer: MEDICARE

## 2019-10-08 VITALS
BODY MASS INDEX: 27 KG/M2 | DIASTOLIC BLOOD PRESSURE: 71 MMHG | WEIGHT: 167.19 LBS | TEMPERATURE: 98 F | RESPIRATION RATE: 18 BRPM | HEART RATE: 88 BPM | SYSTOLIC BLOOD PRESSURE: 112 MMHG

## 2019-10-08 DIAGNOSIS — R41.89 COGNITIVE CHANGES: ICD-10-CM

## 2019-10-08 DIAGNOSIS — I10 ESSENTIAL HYPERTENSION: ICD-10-CM

## 2019-10-08 DIAGNOSIS — I63.9 CEREBROVASCULAR ACCIDENT (CVA), UNSPECIFIED MECHANISM (HCC): Primary | ICD-10-CM

## 2019-10-08 DIAGNOSIS — E78.5 HYPERLIPIDEMIA, UNSPECIFIED HYPERLIPIDEMIA TYPE: ICD-10-CM

## 2019-10-08 DIAGNOSIS — R26.89 IMBALANCE: ICD-10-CM

## 2019-10-08 PROCEDURE — 1111F DSCHRG MED/CURRENT MED MERGE: CPT | Performed by: FAMILY MEDICINE

## 2019-10-08 PROCEDURE — G0008 ADMIN INFLUENZA VIRUS VAC: HCPCS | Performed by: FAMILY MEDICINE

## 2019-10-08 PROCEDURE — 90662 IIV NO PRSV INCREASED AG IM: CPT | Performed by: FAMILY MEDICINE

## 2019-10-08 PROCEDURE — 99214 OFFICE O/P EST MOD 30 MIN: CPT | Performed by: FAMILY MEDICINE

## 2019-10-08 RX ORDER — ATORVASTATIN CALCIUM 80 MG/1
80 TABLET, FILM COATED ORAL
COMMUNITY
Start: 2019-09-18 | End: 2019-10-14

## 2019-10-08 NOTE — PROGRESS NOTES
HPI:    Patient ID: Chet Frost is a 69year old female. CVA   This is a new problem. The current episode started 1 to 4 weeks ago. The problem has been gradually improving. Associated symptoms include fatigue.  Pertinent negatives include no chest time. No cranial nerve deficit. Coordination normal.   Mild instability with rising from chair. Unable to do serial sevens. Able to spell backwards. Skin: Skin is warm and dry.               ASSESSMENT/PLAN:   Cerebrovascular accident (cva), unspecified

## 2019-10-09 RX ORDER — ESCITALOPRAM OXALATE 20 MG/1
TABLET ORAL
Qty: 90 TABLET | Refills: 1 | Status: SHIPPED | OUTPATIENT
Start: 2019-10-09 | End: 2020-04-02

## 2019-10-09 NOTE — TELEPHONE ENCOUNTER
Refill passed per [facility], [facility] protocol.     Requested Prescriptions   Pending Prescriptions Disp Refills   • ESCITALOPRAM 20 MG Oral Tab [Pharmacy Med Name: ESCITALOPRAM 20 MG TABLET] 90 tablet 1     Sig: TAKE 1 TABLET BY MOUTH EVERY DAY       Psychiatr

## 2019-10-14 RX ORDER — ATORVASTATIN CALCIUM 80 MG/1
80 TABLET, FILM COATED ORAL NIGHTLY
Qty: 90 TABLET | Refills: 1 | Status: CANCELLED | OUTPATIENT
Start: 2019-10-14

## 2019-10-14 RX ORDER — ATORVASTATIN CALCIUM 80 MG/1
80 TABLET, FILM COATED ORAL NIGHTLY
Qty: 90 TABLET | Refills: 1 | Status: SHIPPED | OUTPATIENT
Start: 2019-10-14 | End: 2020-06-08

## 2019-10-14 RX ORDER — PRAVASTATIN SODIUM 80 MG/1
TABLET ORAL
Qty: 90 TABLET | Refills: 3 | OUTPATIENT
Start: 2019-10-14

## 2019-10-14 NOTE — TELEPHONE ENCOUNTER
Duplicate request, previously addressed. Spoke with patient and is not taking Pravastatin patient is taking Atorvastatin 80 mg tab.

## 2019-10-14 NOTE — TELEPHONE ENCOUNTER
Refill passed per [facility], [facility] protocol.   Cholesterol Medications  Protocol Criteria:  · Appointment scheduled in the past 12 months or in the next 3 months  · ALT & LDL on file in the past 12 months  · ALT result < 80  · LDL result <130   Recent Outpat

## 2019-11-05 ENCOUNTER — APPOINTMENT (OUTPATIENT)
Dept: LAB | Age: 74
End: 2019-11-05
Attending: FAMILY MEDICINE
Payer: MEDICARE

## 2019-11-05 DIAGNOSIS — E78.5 HYPERLIPIDEMIA, UNSPECIFIED HYPERLIPIDEMIA TYPE: ICD-10-CM

## 2019-11-05 PROCEDURE — 80061 LIPID PANEL: CPT

## 2019-11-05 PROCEDURE — 80053 COMPREHEN METABOLIC PANEL: CPT

## 2019-11-05 PROCEDURE — 36415 COLL VENOUS BLD VENIPUNCTURE: CPT

## 2019-11-19 ENCOUNTER — OFFICE VISIT (OUTPATIENT)
Dept: FAMILY MEDICINE CLINIC | Facility: CLINIC | Age: 74
End: 2019-11-19
Payer: MEDICARE

## 2019-11-19 VITALS
TEMPERATURE: 97 F | DIASTOLIC BLOOD PRESSURE: 72 MMHG | SYSTOLIC BLOOD PRESSURE: 105 MMHG | BODY MASS INDEX: 27 KG/M2 | HEART RATE: 76 BPM | WEIGHT: 166.19 LBS | RESPIRATION RATE: 18 BRPM

## 2019-11-19 DIAGNOSIS — L98.9 SKIN LESION OF LEFT LEG: ICD-10-CM

## 2019-11-19 DIAGNOSIS — I10 ESSENTIAL HYPERTENSION: ICD-10-CM

## 2019-11-19 DIAGNOSIS — E11.9 CONTROLLED TYPE 2 DIABETES MELLITUS WITHOUT COMPLICATION, WITHOUT LONG-TERM CURRENT USE OF INSULIN (HCC): ICD-10-CM

## 2019-11-19 DIAGNOSIS — R05.9 COUGH: ICD-10-CM

## 2019-11-19 DIAGNOSIS — I63.9 CEREBROVASCULAR ACCIDENT (CVA), UNSPECIFIED MECHANISM (HCC): Primary | ICD-10-CM

## 2019-11-19 PROBLEM — Z86.73 HISTORY OF CVA (CEREBROVASCULAR ACCIDENT): Status: ACTIVE | Noted: 2019-11-19

## 2019-11-19 PROCEDURE — 99214 OFFICE O/P EST MOD 30 MIN: CPT | Performed by: FAMILY MEDICINE

## 2019-11-19 NOTE — PROGRESS NOTES
HPI:    Patient ID: Sumanth Armendariz is a 69year old female. CVA   The current episode started more than 1 month ago. The problem has been gradually improving. Associated symptoms include congestion and coughing.  Pertinent negatives include no fatigue, normal.   Neck: No neck rigidity. Cardiovascular: Normal rate and regular rhythm. Pulmonary/Chest: Effort normal and breath sounds normal. She has no wheezes. Skin: Skin is warm and dry.               ASSESSMENT/PLAN:   Cerebrovascular accident (cva),

## 2019-11-21 RX ORDER — LEVOTHYROXINE SODIUM 88 UG/1
88 TABLET ORAL
Qty: 90 TABLET | Refills: 1 | Status: SHIPPED | OUTPATIENT
Start: 2019-11-21 | End: 2020-05-18

## 2019-11-21 NOTE — TELEPHONE ENCOUNTER
Refill passed per [facility] protocol.   Hypothyroid Medications  Protocol Criteria:  Appointment scheduled in the past 12 months or the next 3 months  TSH resulted in the past 12 months that is normal  Recent Outpatient Visits            2 days ago Cerebrovascular accident (CVA), unspecified mechanism [facility])    [facility] Tal Lee Carmela Motts, MD    Office Visit    1 month ago Cerebrovascular accident (CVA), unspecified mechanism [facility])    [facility] Denia Lee MD    Office Visit    2 months ago Normal pressure hydrocephalus [facility])    [facility] Kurtis Espino, Denia Sanchez MD    Office Visit    3 months ago Routine physical examination    [facility], Carmela Motts, MD    Office Visit    10 months ago Type 2 diabetes mellitus without complication, without long-term current use of insulin [facility])    [facility] Tal Lee Carmela Motts, MD    Office Visit        Future Appointments       Provider Department Appt Notes    In 2 months Forest Sanchez MD [facility] Denia Lee 3 mt follow up        Lab Results   Component Value Date    TSH 1.640 08/09/2019

## 2019-12-19 ENCOUNTER — TELEPHONE (OUTPATIENT)
Dept: FAMILY MEDICINE CLINIC | Facility: CLINIC | Age: 74
End: 2019-12-19

## 2019-12-19 RX ORDER — LOSARTAN POTASSIUM 25 MG/1
50 TABLET ORAL DAILY
Qty: 180 TABLET | Refills: 1 | Status: SHIPPED | OUTPATIENT
Start: 2019-12-19 | End: 2020-06-11

## 2019-12-19 NOTE — TELEPHONE ENCOUNTER
Pt would like a refill on losartan medication. Per pt she is out of medication. Pharmacy [facility]. Per pt the pharmacy is not out of the 50 milligram medication and they are requesting two pills of 25 milligrams each. Please, call pt with any questions.

## 2020-01-28 RX ORDER — METFORMIN HYDROCHLORIDE 500 MG/1
TABLET, EXTENDED RELEASE ORAL
Qty: 90 TABLET | Refills: 3 | Status: SHIPPED | OUTPATIENT
Start: 2020-01-28 | End: 2020-12-08

## 2020-01-29 NOTE — TELEPHONE ENCOUNTER
Review pended refill request as it does not fall under a protocol.     Last Rx: 2/2019  LOV: 2 months ago

## 2020-02-11 ENCOUNTER — APPOINTMENT (OUTPATIENT)
Dept: LAB | Age: 75
End: 2020-02-11
Attending: FAMILY MEDICINE
Payer: MEDICARE

## 2020-02-11 DIAGNOSIS — E11.9 CONTROLLED TYPE 2 DIABETES MELLITUS WITHOUT COMPLICATION, WITHOUT LONG-TERM CURRENT USE OF INSULIN (HCC): ICD-10-CM

## 2020-02-11 LAB
EST. AVERAGE GLUCOSE BLD GHB EST-MCNC: 131 MG/DL (ref 68–126)
HBA1C MFR BLD HPLC: 6.2 % (ref ?–5.7)

## 2020-02-11 PROCEDURE — 36415 COLL VENOUS BLD VENIPUNCTURE: CPT

## 2020-02-11 PROCEDURE — 83036 HEMOGLOBIN GLYCOSYLATED A1C: CPT

## 2020-02-18 ENCOUNTER — OFFICE VISIT (OUTPATIENT)
Dept: FAMILY MEDICINE CLINIC | Facility: CLINIC | Age: 75
End: 2020-02-18
Payer: MEDICARE

## 2020-02-18 VITALS
HEART RATE: 78 BPM | WEIGHT: 167.19 LBS | SYSTOLIC BLOOD PRESSURE: 119 MMHG | BODY MASS INDEX: 27 KG/M2 | RESPIRATION RATE: 18 BRPM | DIASTOLIC BLOOD PRESSURE: 71 MMHG | TEMPERATURE: 97 F

## 2020-02-18 DIAGNOSIS — E11.9 CONTROLLED TYPE 2 DIABETES MELLITUS WITHOUT COMPLICATION, WITHOUT LONG-TERM CURRENT USE OF INSULIN (HCC): Primary | ICD-10-CM

## 2020-02-18 DIAGNOSIS — F33.41 RECURRENT MAJOR DEPRESSIVE DISORDER, IN PARTIAL REMISSION (HCC): ICD-10-CM

## 2020-02-18 DIAGNOSIS — I10 ESSENTIAL HYPERTENSION: ICD-10-CM

## 2020-02-18 DIAGNOSIS — L98.9 SKIN LESION OF LEFT LEG: ICD-10-CM

## 2020-02-18 PROCEDURE — 99213 OFFICE O/P EST LOW 20 MIN: CPT | Performed by: FAMILY MEDICINE

## 2020-02-18 NOTE — PROGRESS NOTES
HPI:    Patient ID: Rosaura Herrera is a 69year old female. Diabetes   She presents for her follow-up diabetic visit. She has type 2 diabetes mellitus. Her disease course has been stable. There are no hypoglycemic associated symptoms.  There are no jesse and breath sounds normal.   Lymphadenopathy:     She has no cervical adenopathy. Neurological: She is alert and oriented to person, place, and time. Skin: Skin is warm and dry.    Left anterior ankle with 5 mm raised nodular smooth lesion              A

## 2020-04-02 RX ORDER — ESCITALOPRAM OXALATE 20 MG/1
TABLET ORAL
Qty: 90 TABLET | Refills: 1 | Status: SHIPPED | OUTPATIENT
Start: 2020-04-02 | End: 2020-09-29

## 2020-04-30 NOTE — TELEPHONE ENCOUNTER
Jaja House MD 1 hour ago (12:56 PM)         Hi, Dr. Meriam Simmonds stopped taking Losartin 2 weeks ago.  The coughing hasn't stopped or gotten better. Jefferson Rosen I start taking it again? Devere Page other suggestions? Darlyn Rush Admission

## 2020-05-18 RX ORDER — LEVOTHYROXINE SODIUM 88 UG/1
88 TABLET ORAL
Qty: 90 TABLET | Refills: 1 | Status: SHIPPED | OUTPATIENT
Start: 2020-05-18 | End: 2020-11-11

## 2020-06-08 RX ORDER — ATORVASTATIN CALCIUM 80 MG/1
TABLET, FILM COATED ORAL
Qty: 90 TABLET | Refills: 1 | Status: SHIPPED | OUTPATIENT
Start: 2020-06-08 | End: 2020-12-08

## 2020-06-11 RX ORDER — LOSARTAN POTASSIUM 25 MG/1
TABLET ORAL
Qty: 180 TABLET | Refills: 1 | Status: SHIPPED | OUTPATIENT
Start: 2020-06-11 | End: 2020-08-24

## 2020-08-19 ENCOUNTER — LABORATORY ENCOUNTER (OUTPATIENT)
Dept: LAB | Facility: REFERENCE LAB | Age: 75
End: 2020-08-19
Attending: FAMILY MEDICINE
Payer: MEDICARE

## 2020-08-19 DIAGNOSIS — E11.9 CONTROLLED TYPE 2 DIABETES MELLITUS WITHOUT COMPLICATION, WITHOUT LONG-TERM CURRENT USE OF INSULIN (HCC): ICD-10-CM

## 2020-08-19 LAB
ALBUMIN SERPL-MCNC: 3.6 G/DL (ref 3.4–5)
ALBUMIN/GLOB SERPL: 1.3 {RATIO} (ref 1–2)
ALP LIVER SERPL-CCNC: 66 U/L (ref 55–142)
ALT SERPL-CCNC: 50 U/L (ref 13–56)
ANION GAP SERPL CALC-SCNC: 5 MMOL/L (ref 0–18)
AST SERPL-CCNC: 19 U/L (ref 15–37)
BILIRUB SERPL-MCNC: 0.9 MG/DL (ref 0.1–2)
BUN BLD-MCNC: 21 MG/DL (ref 7–18)
BUN/CREAT SERPL: 28.4 (ref 10–20)
CALCIUM BLD-MCNC: 9.8 MG/DL (ref 8.5–10.1)
CHLORIDE SERPL-SCNC: 111 MMOL/L (ref 98–112)
CHOLEST SMN-MCNC: 146 MG/DL (ref ?–200)
CO2 SERPL-SCNC: 27 MMOL/L (ref 21–32)
CREAT BLD-MCNC: 0.74 MG/DL (ref 0.55–1.02)
CREAT UR-SCNC: 160 MG/DL
EST. AVERAGE GLUCOSE BLD GHB EST-MCNC: 137 MG/DL (ref 68–126)
GLOBULIN PLAS-MCNC: 2.8 G/DL (ref 2.8–4.4)
GLUCOSE BLD-MCNC: 132 MG/DL (ref 70–99)
HBA1C MFR BLD HPLC: 6.4 % (ref ?–5.7)
HDLC SERPL-MCNC: 56 MG/DL (ref 40–59)
LDLC SERPL CALC-MCNC: 69 MG/DL (ref ?–100)
M PROTEIN MFR SERPL ELPH: 6.4 G/DL (ref 6.4–8.2)
MICROALBUMIN UR-MCNC: 1.9 MG/DL
MICROALBUMIN/CREAT 24H UR-RTO: 11.9 UG/MG (ref ?–30)
NONHDLC SERPL-MCNC: 90 MG/DL (ref ?–130)
OSMOLALITY SERPL CALC.SUM OF ELEC: 301 MOSM/KG (ref 275–295)
PATIENT FASTING Y/N/NP: YES
PATIENT FASTING Y/N/NP: YES
POTASSIUM SERPL-SCNC: 4 MMOL/L (ref 3.5–5.1)
SODIUM SERPL-SCNC: 143 MMOL/L (ref 136–145)
TRIGL SERPL-MCNC: 107 MG/DL (ref 30–149)
TSI SER-ACNC: 3.55 MIU/ML (ref 0.36–3.74)
VLDLC SERPL CALC-MCNC: 21 MG/DL (ref 0–30)

## 2020-08-19 PROCEDURE — 83036 HEMOGLOBIN GLYCOSYLATED A1C: CPT

## 2020-08-19 PROCEDURE — 82043 UR ALBUMIN QUANTITATIVE: CPT

## 2020-08-19 PROCEDURE — 36415 COLL VENOUS BLD VENIPUNCTURE: CPT

## 2020-08-19 PROCEDURE — 82570 ASSAY OF URINE CREATININE: CPT

## 2020-08-19 PROCEDURE — 84443 ASSAY THYROID STIM HORMONE: CPT

## 2020-08-19 PROCEDURE — 80061 LIPID PANEL: CPT

## 2020-08-19 PROCEDURE — 80053 COMPREHEN METABOLIC PANEL: CPT

## 2020-08-24 ENCOUNTER — OFFICE VISIT (OUTPATIENT)
Dept: FAMILY MEDICINE CLINIC | Facility: CLINIC | Age: 75
End: 2020-08-24
Payer: MEDICARE

## 2020-08-24 VITALS
DIASTOLIC BLOOD PRESSURE: 70 MMHG | HEIGHT: 65.75 IN | SYSTOLIC BLOOD PRESSURE: 118 MMHG | BODY MASS INDEX: 27.06 KG/M2 | HEART RATE: 76 BPM | RESPIRATION RATE: 18 BRPM | TEMPERATURE: 98 F | WEIGHT: 166.38 LBS

## 2020-08-24 DIAGNOSIS — M85.80 OSTEOPENIA, UNSPECIFIED LOCATION: ICD-10-CM

## 2020-08-24 DIAGNOSIS — K63.5 POLYP OF COLON, UNSPECIFIED PART OF COLON, UNSPECIFIED TYPE: ICD-10-CM

## 2020-08-24 DIAGNOSIS — Z78.0 ASYMPTOMATIC MENOPAUSAL STATE: ICD-10-CM

## 2020-08-24 DIAGNOSIS — M76.31 ILIOTIBIAL BAND SYNDROME OF RIGHT SIDE: ICD-10-CM

## 2020-08-24 DIAGNOSIS — F33.41 RECURRENT MAJOR DEPRESSIVE DISORDER, IN PARTIAL REMISSION (HCC): ICD-10-CM

## 2020-08-24 DIAGNOSIS — Z12.31 ENCOUNTER FOR SCREENING MAMMOGRAM FOR BREAST CANCER: ICD-10-CM

## 2020-08-24 DIAGNOSIS — G89.29 CHRONIC RIGHT-SIDED LOW BACK PAIN WITHOUT SCIATICA: ICD-10-CM

## 2020-08-24 DIAGNOSIS — M54.50 CHRONIC RIGHT-SIDED LOW BACK PAIN WITHOUT SCIATICA: ICD-10-CM

## 2020-08-24 DIAGNOSIS — E89.0 POSTABLATIVE HYPOTHYROIDISM: ICD-10-CM

## 2020-08-24 DIAGNOSIS — Z86.73 HISTORY OF CVA (CEREBROVASCULAR ACCIDENT): ICD-10-CM

## 2020-08-24 DIAGNOSIS — G91.2 NORMAL PRESSURE HYDROCEPHALUS (HCC): ICD-10-CM

## 2020-08-24 DIAGNOSIS — E11.9 TYPE 2 DIABETES MELLITUS WITHOUT COMPLICATION, WITHOUT LONG-TERM CURRENT USE OF INSULIN (HCC): ICD-10-CM

## 2020-08-24 DIAGNOSIS — Z00.00 ROUTINE PHYSICAL EXAMINATION: Primary | ICD-10-CM

## 2020-08-24 DIAGNOSIS — M17.11 PRIMARY OSTEOARTHRITIS OF RIGHT KNEE: ICD-10-CM

## 2020-08-24 DIAGNOSIS — E78.5 HYPERLIPIDEMIA, UNSPECIFIED HYPERLIPIDEMIA TYPE: ICD-10-CM

## 2020-08-24 DIAGNOSIS — Z00.00 ENCOUNTER FOR ANNUAL HEALTH EXAMINATION: ICD-10-CM

## 2020-08-24 PROCEDURE — G0439 PPPS, SUBSEQ VISIT: HCPCS | Performed by: FAMILY MEDICINE

## 2020-08-24 NOTE — PROGRESS NOTES
HPI:   Deanna Baer is a 77year old female who presents for a Medicare Subsequent Annual Wellness visit (Pt already had Initial Annual Wellness).     Generally well  Her last annual assessment has been over 1 year: Annual Physical due on 08/19/2020 right-sided low back pain without sciatica     Primary osteoarthritis of right knee: mild-mod medial joint and patellar OA     History of CVA (cerebrovascular accident)     Recurrent major depressive disorder, in partial remission ([facility] [address]    Wt Readings from (9/5/2018), L4-5 grade 2 unstable, L3-4 grade 1 unstable spondylolisthesis (9/5/2018), L4-5 mod central, L3-4 right mild lateral recess stenosis (9/5/2018), L5-S1 mild central HNP (9/5/2018), left S1 radiculopathy (9/5/2018), Microscopic hematuria (1999), Many people I talk to seem to mumble (or don't speak clearly):  No People get annoyed because I misunderstand what they say:  No   I misunderstand what others are saying and make inappropriate responses:  No I avoid social activities because I cannot hea High Dose 65 YRS & Older PRSV Free (01148) 11/02/2015, 10/18/2017, 10/13/2018, 10/08/2019   • FLUAD High Dose 72 yr and older (72128) 10/13/2018   • Fluzone Vaccine Medicare () 11/02/2015, 10/12/2016, 10/17/2017   • Influenza 10/21/2008, 09/15/2009, 0 Neva Castellanos in 2018. Repeat DEXA scan in 2020 recommended. Ordered today. -     XR DEXA BONE DENSITOMETRY (CPT=77080);  Future    Chronic right-sided low back pain without sciatica–stable    Primary osteoarthritis of right knee: mild-mod medial joint and p date01/02/2018       Colorectal Cancer Screening      Colonoscopy Screen every 10 years Colonoscopy due on 09/06/2023 Update Health Maintenance if applicable    Flex Sigmoidoscopy Screen every 10 years No results found for this or any previous visit.  No fl prescription benefits, but Medicare does not cover unless Medically needed    Zoster  Not covered by Medicare Part B No vaccine history found This may be covered with your pharmacy  prescription benefits      SPECIFIC DISEASE MONITORING Internal Lab or Pro

## 2020-08-24 NOTE — PATIENT INSTRUCTIONS
Jyotsna Grandchild SCREENING SCHEDULE   Tests on this list are recommended by your physician but may not be covered, or covered at this frequency, by your insurer. Please check with your insurance carrier before scheduling to verify coverage.    PREVENTAT Covered up to Age 76     Colonoscopy Screen   Covered every 10 years- more often if abnormal Colonoscopy due on 09/06/2023 Update Health Maintenance if applicable    Flex Sigmoidoscopy Screen  Covered every 5 years No results found for this or any previous placed or performed in visit on 01/12/15   • PNEUMOCOCCAL VACC, 13 SAYRA IM    Please get once after your 65th birthday    Pneumococcal 23 (Pneumovax)  Covered Once after 65 Orders placed or performed in visit on 01/12/17   • PNEUMOCOCCAL IMM (PNEUMOVAX) Please check with your insurance carrier before scheduling to verify coverage.    PREVENTATIVE SERVICES  INDICATIONS AND SCHEDULE Internal Lab or Procedure External Lab or Procedure   Diabetes Screening      HbgA1C    At Least  Annually for Diabetics HEMOGL Flex Sigmoidoscopy Screen  Covered every 5 years No results found for this or any previous visit. No flowsheet data found. Fecal Occult Blood   Covered Annually No results found for: FOB, OCCULTSTOOL No flowsheet data found.      Barium Enema-   uncomfo after 65 Orders placed or performed in visit on 01/12/17   • PNEUMOCOCCAL IMM (PNEUMOVAX)    Please get once after your 65th birthday    Hepatitis B for Moderate/High Risk       No orders found for this or any previous visit.  Medium/high risk factors:   En

## 2020-08-31 RX ORDER — HYDROCHLOROTHIAZIDE 12.5 MG/1
TABLET ORAL
Qty: 90 TABLET | Refills: 3 | Status: SHIPPED | OUTPATIENT
Start: 2020-08-31 | End: 2021-08-29

## 2020-09-11 ENCOUNTER — TELEPHONE (OUTPATIENT)
Dept: FAMILY MEDICINE CLINIC | Facility: CLINIC | Age: 75
End: 2020-09-11

## 2020-09-11 DIAGNOSIS — Z78.0 ASYMPTOMATIC MENOPAUSAL STATE: Primary | ICD-10-CM

## 2020-09-11 NOTE — TELEPHONE ENCOUNTER
Aj Forde with [facility] called and advised the Diagnosis Code used in the order for the Dexa Scan (M85.80) does NOT pass Medical necessity.     She advised that we SHOULD be using any of the following Diagnosis Codes: M81.0, OR M81.8, OR

## 2020-09-28 ENCOUNTER — TELEPHONE (OUTPATIENT)
Dept: FAMILY MEDICINE CLINIC | Facility: CLINIC | Age: 75
End: 2020-09-28

## 2020-09-28 NOTE — TELEPHONE ENCOUNTER
Informed of bone density test results with osteopenia. Low fracture risk. Plan to repeat 2 to 3 years.

## 2020-09-29 RX ORDER — ESCITALOPRAM OXALATE 20 MG/1
TABLET ORAL
Qty: 90 TABLET | Refills: 3 | Status: SHIPPED | OUTPATIENT
Start: 2020-09-29 | End: 2021-09-17

## 2020-10-05 ENCOUNTER — TELEPHONE (OUTPATIENT)
Dept: FAMILY MEDICINE CLINIC | Facility: CLINIC | Age: 75
End: 2020-10-05

## 2020-10-05 NOTE — TELEPHONE ENCOUNTER
Spoke with Tess Elders and notified her of normal mammogram result. Pt verbalized understanding.  No further action needed

## 2020-11-11 RX ORDER — LEVOTHYROXINE SODIUM 88 UG/1
88 TABLET ORAL
Qty: 90 TABLET | Refills: 3 | Status: SHIPPED | OUTPATIENT
Start: 2020-11-11 | End: 2021-11-06

## 2020-12-08 RX ORDER — LOSARTAN POTASSIUM 25 MG/1
TABLET ORAL
Qty: 180 TABLET | Refills: 3 | Status: SHIPPED | OUTPATIENT
Start: 2020-12-08 | End: 2021-11-26

## 2020-12-08 RX ORDER — ATORVASTATIN CALCIUM 80 MG/1
TABLET, FILM COATED ORAL
Qty: 90 TABLET | Refills: 3 | Status: SHIPPED | OUTPATIENT
Start: 2020-12-08 | End: 2021-11-26

## 2020-12-08 RX ORDER — METFORMIN HYDROCHLORIDE 500 MG/1
TABLET, EXTENDED RELEASE ORAL
Qty: 90 TABLET | Refills: 3 | Status: SHIPPED | OUTPATIENT
Start: 2020-12-08 | End: 2022-01-22

## 2021-03-08 DIAGNOSIS — Z23 NEED FOR VACCINATION: ICD-10-CM

## 2021-03-16 ENCOUNTER — LAB ENCOUNTER (OUTPATIENT)
Dept: LAB | Age: 76
End: 2021-03-16
Attending: FAMILY MEDICINE
Payer: MEDICARE

## 2021-03-16 ENCOUNTER — APPOINTMENT (OUTPATIENT)
Dept: LAB | Age: 76
End: 2021-03-16
Attending: FAMILY MEDICINE
Payer: MEDICARE

## 2021-03-16 DIAGNOSIS — E11.9 TYPE 2 DIABETES MELLITUS WITHOUT COMPLICATION, WITHOUT LONG-TERM CURRENT USE OF INSULIN (HCC): ICD-10-CM

## 2021-03-16 LAB
EST. AVERAGE GLUCOSE BLD GHB EST-MCNC: 148 MG/DL (ref 68–126)
HBA1C MFR BLD HPLC: 6.8 % (ref ?–5.7)

## 2021-03-16 PROCEDURE — 83036 HEMOGLOBIN GLYCOSYLATED A1C: CPT

## 2021-03-16 PROCEDURE — 36415 COLL VENOUS BLD VENIPUNCTURE: CPT

## 2021-03-18 ENCOUNTER — OFFICE VISIT (OUTPATIENT)
Dept: FAMILY MEDICINE CLINIC | Facility: CLINIC | Age: 76
End: 2021-03-18
Payer: MEDICARE

## 2021-03-18 VITALS
SYSTOLIC BLOOD PRESSURE: 116 MMHG | BODY MASS INDEX: 27.32 KG/M2 | WEIGHT: 168 LBS | HEART RATE: 76 BPM | DIASTOLIC BLOOD PRESSURE: 81 MMHG | TEMPERATURE: 98 F | HEIGHT: 65.75 IN | RESPIRATION RATE: 18 BRPM

## 2021-03-18 DIAGNOSIS — I10 ESSENTIAL HYPERTENSION: ICD-10-CM

## 2021-03-18 DIAGNOSIS — E11.9 TYPE 2 DIABETES MELLITUS WITHOUT COMPLICATION, WITHOUT LONG-TERM CURRENT USE OF INSULIN (HCC): Primary | ICD-10-CM

## 2021-03-18 DIAGNOSIS — F33.41 RECURRENT MAJOR DEPRESSIVE DISORDER, IN PARTIAL REMISSION (HCC): ICD-10-CM

## 2021-03-18 DIAGNOSIS — G91.2 NORMAL PRESSURE HYDROCEPHALUS (HCC): ICD-10-CM

## 2021-03-18 PROBLEM — G91.0 COMMUNICATING HYDROCEPHALUS (HCC): Status: ACTIVE | Noted: 2021-03-18

## 2021-03-18 PROBLEM — M35.01 SICCA SYNDROME WITH KERATOCONJUNCTIVITIS (HCC): Status: ACTIVE | Noted: 2021-03-18

## 2021-03-18 PROBLEM — M35.01 SICCA SYNDROME WITH KERATOCONJUNCTIVITIS: Status: ACTIVE | Noted: 2021-03-18

## 2021-03-18 PROBLEM — H16.229 SICCA SYNDROME WITH KERATOCONJUNCTIVITIS: Status: ACTIVE | Noted: 2021-03-18

## 2021-03-18 PROCEDURE — 99213 OFFICE O/P EST LOW 20 MIN: CPT | Performed by: FAMILY MEDICINE

## 2021-03-18 NOTE — PROGRESS NOTES
HPI/Subjective:   Patient ID: Kelton Delatorre is a 77year old female. Diabetes  She presents for her follow-up diabetic visit. She has type 2 diabetes mellitus. Her disease course has been worsening. There are no hypoglycemic associated symptoms.  Pert Musculoskeletal:      Cervical back: Neck supple. Lymphadenopathy:      Cervical: No cervical adenopathy. Skin:     General: Skin is warm and dry. Neurological:      Mental Status: She is alert and oriented to person, place, and time.       Comments

## 2021-08-29 RX ORDER — HYDROCHLOROTHIAZIDE 12.5 MG/1
TABLET ORAL
Qty: 90 TABLET | Refills: 3 | Status: SHIPPED | OUTPATIENT
Start: 2021-08-29

## 2021-09-17 RX ORDER — ESCITALOPRAM OXALATE 20 MG/1
20 TABLET ORAL DAILY
Qty: 90 TABLET | Refills: 1 | Status: SHIPPED | OUTPATIENT
Start: 2021-09-17

## 2021-09-17 NOTE — TELEPHONE ENCOUNTER
Refill passed per [facility] [facility], [facility] protocol.     Requested Prescriptions   Pending Prescriptions Disp Refills    ESCITALOPRAM 20 MG Oral Tab [Pharmacy Med Name: ESCITALOPRAM 20 MG TABLET] 90 tablet 3     Sig: TAKE 1 TABLET BY MOUTH EVERY DAY        Psychiatr

## 2021-09-17 NOTE — TELEPHONE ENCOUNTER
Refill passes per protocol, but warning that 20 mg dose of Escitalopram exceeds max dose of 10 mg daily.

## 2021-10-01 ENCOUNTER — LAB ENCOUNTER (OUTPATIENT)
Dept: LAB | Age: 76
End: 2021-10-01
Attending: FAMILY MEDICINE
Payer: MEDICARE

## 2021-10-01 DIAGNOSIS — E11.9 TYPE 2 DIABETES MELLITUS WITHOUT COMPLICATION, WITHOUT LONG-TERM CURRENT USE OF INSULIN (HCC): ICD-10-CM

## 2021-10-01 PROCEDURE — 36415 COLL VENOUS BLD VENIPUNCTURE: CPT

## 2021-10-01 PROCEDURE — 80061 LIPID PANEL: CPT

## 2021-10-01 PROCEDURE — 83036 HEMOGLOBIN GLYCOSYLATED A1C: CPT

## 2021-10-01 PROCEDURE — 82570 ASSAY OF URINE CREATININE: CPT

## 2021-10-01 PROCEDURE — 80053 COMPREHEN METABOLIC PANEL: CPT

## 2021-10-01 PROCEDURE — 82043 UR ALBUMIN QUANTITATIVE: CPT

## 2021-10-01 PROCEDURE — 84443 ASSAY THYROID STIM HORMONE: CPT

## 2021-10-12 ENCOUNTER — OFFICE VISIT (OUTPATIENT)
Dept: FAMILY MEDICINE CLINIC | Facility: CLINIC | Age: 76
End: 2021-10-12
Payer: MEDICARE

## 2021-10-12 VITALS
RESPIRATION RATE: 18 BRPM | HEART RATE: 77 BPM | WEIGHT: 162.38 LBS | HEIGHT: 65.75 IN | TEMPERATURE: 97 F | DIASTOLIC BLOOD PRESSURE: 73 MMHG | SYSTOLIC BLOOD PRESSURE: 124 MMHG | BODY MASS INDEX: 26.41 KG/M2

## 2021-10-12 DIAGNOSIS — K63.5 POLYP OF COLON, UNSPECIFIED PART OF COLON, UNSPECIFIED TYPE: ICD-10-CM

## 2021-10-12 DIAGNOSIS — E78.5 HYPERLIPIDEMIA, UNSPECIFIED HYPERLIPIDEMIA TYPE: ICD-10-CM

## 2021-10-12 DIAGNOSIS — Z12.31 ENCOUNTER FOR SCREENING MAMMOGRAM FOR BREAST CANCER: ICD-10-CM

## 2021-10-12 DIAGNOSIS — L98.9 SKIN LESION OF LEFT LEG: ICD-10-CM

## 2021-10-12 DIAGNOSIS — F33.41 RECURRENT MAJOR DEPRESSIVE DISORDER, IN PARTIAL REMISSION (HCC): ICD-10-CM

## 2021-10-12 DIAGNOSIS — H91.93 BILATERAL HEARING LOSS, UNSPECIFIED HEARING LOSS TYPE: ICD-10-CM

## 2021-10-12 DIAGNOSIS — Z00.00 ROUTINE PHYSICAL EXAMINATION: Primary | ICD-10-CM

## 2021-10-12 DIAGNOSIS — M17.11 PRIMARY OSTEOARTHRITIS OF RIGHT KNEE: ICD-10-CM

## 2021-10-12 DIAGNOSIS — Z86.73 HISTORY OF CVA (CEREBROVASCULAR ACCIDENT): ICD-10-CM

## 2021-10-12 DIAGNOSIS — M85.80 OSTEOPENIA, UNSPECIFIED LOCATION: ICD-10-CM

## 2021-10-12 DIAGNOSIS — E11.9 TYPE 2 DIABETES MELLITUS WITHOUT COMPLICATION, WITHOUT LONG-TERM CURRENT USE OF INSULIN (HCC): ICD-10-CM

## 2021-10-12 DIAGNOSIS — G89.29 CHRONIC RIGHT-SIDED LOW BACK PAIN WITHOUT SCIATICA: ICD-10-CM

## 2021-10-12 DIAGNOSIS — Z00.00 ENCOUNTER FOR ANNUAL HEALTH EXAMINATION: ICD-10-CM

## 2021-10-12 DIAGNOSIS — G91.2 NORMAL PRESSURE HYDROCEPHALUS (HCC): ICD-10-CM

## 2021-10-12 DIAGNOSIS — E89.0 POSTABLATIVE HYPOTHYROIDISM: ICD-10-CM

## 2021-10-12 DIAGNOSIS — E03.9 HYPOTHYROIDISM, UNSPECIFIED TYPE: ICD-10-CM

## 2021-10-12 DIAGNOSIS — M54.50 CHRONIC RIGHT-SIDED LOW BACK PAIN WITHOUT SCIATICA: ICD-10-CM

## 2021-10-12 PROBLEM — H16.229 SICCA SYNDROME WITH KERATOCONJUNCTIVITIS: Status: RESOLVED | Noted: 2021-03-18 | Resolved: 2021-10-12

## 2021-10-12 PROBLEM — M35.01 SICCA SYNDROME WITH KERATOCONJUNCTIVITIS: Status: RESOLVED | Noted: 2021-03-18 | Resolved: 2021-10-12

## 2021-10-12 PROBLEM — M35.01 SICCA SYNDROME WITH KERATOCONJUNCTIVITIS (HCC): Status: RESOLVED | Noted: 2021-03-18 | Resolved: 2021-10-12

## 2021-10-12 PROCEDURE — 90662 IIV NO PRSV INCREASED AG IM: CPT | Performed by: FAMILY MEDICINE

## 2021-10-12 PROCEDURE — G0439 PPPS, SUBSEQ VISIT: HCPCS | Performed by: FAMILY MEDICINE

## 2021-10-12 PROCEDURE — G0008 ADMIN INFLUENZA VIRUS VAC: HCPCS | Performed by: FAMILY MEDICINE

## 2021-10-12 RX ORDER — LEVOTHYROXINE SODIUM 0.1 MG/1
100 TABLET ORAL DAILY
Qty: 90 TABLET | Refills: 0 | Status: SHIPPED | OUTPATIENT
Start: 2021-10-12 | End: 2021-11-19

## 2021-10-12 NOTE — PATIENT INSTRUCTIONS
Sophie Holman SCREENING SCHEDULE   Tests on this list are recommended by your physician but may not be covered, or covered at this frequency, by your insurer. Please check with your insurance carrier before scheduling to verify coverage.    PREVENT Scan Never done   Pap and Pelvic    Pap   Covered every 2 years for women at normal risk;  Annually if at high risk -  No recommendations at this time    Chlamydia Annually if high risk -  No recommendations at this time   Screening Mammogram    Mammogram CREATSERUM 0.65 10/01/2021         BUN Annually Lab Results   Component Value Date    BUN 18 10/01/2021       Drug Serum Conc Annually No results found for: DIGOXIN, DIG, VALP              Recommended Websites for Advanced Directives    [email or web address]
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
## 2021-11-06 RX ORDER — LEVOTHYROXINE SODIUM 88 UG/1
88 TABLET ORAL
Qty: 90 TABLET | Refills: 3 | Status: SHIPPED | OUTPATIENT
Start: 2021-11-06 | End: 2021-10-09

## 2021-11-18 ENCOUNTER — LAB ENCOUNTER (OUTPATIENT)
Dept: LAB | Age: 76
End: 2021-11-18
Attending: FAMILY MEDICINE
Payer: MEDICARE

## 2021-11-18 DIAGNOSIS — E03.9 HYPOTHYROIDISM, UNSPECIFIED TYPE: ICD-10-CM

## 2021-11-18 DIAGNOSIS — E78.5 HYPERLIPIDEMIA, UNSPECIFIED HYPERLIPIDEMIA TYPE: ICD-10-CM

## 2021-11-18 PROCEDURE — 36415 COLL VENOUS BLD VENIPUNCTURE: CPT

## 2021-11-18 PROCEDURE — 84443 ASSAY THYROID STIM HORMONE: CPT

## 2021-11-19 RX ORDER — LEVOTHYROXINE SODIUM 0.1 MG/1
100 TABLET ORAL DAILY
Qty: 90 TABLET | Refills: 3 | Status: SHIPPED | OUTPATIENT
Start: 2021-11-19 | End: 2022-11-14

## 2021-11-26 RX ORDER — ATORVASTATIN CALCIUM 80 MG/1
80 TABLET, FILM COATED ORAL NIGHTLY
Qty: 90 TABLET | Refills: 1 | Status: SHIPPED | OUTPATIENT
Start: 2021-11-26 | End: 2022-05-21

## 2021-11-26 RX ORDER — LOSARTAN POTASSIUM 25 MG/1
50 TABLET ORAL DAILY
Qty: 180 TABLET | Refills: 1 | Status: SHIPPED | OUTPATIENT
Start: 2021-11-26 | End: 2022-05-21

## 2021-11-26 NOTE — TELEPHONE ENCOUNTER
Refill passed per [facility] protocol.     Requested Prescriptions   Pending Prescriptions Disp Refills    ATORVASTATIN 80 MG Oral Tab [Pharmacy Med Name: ATORVASTATIN 80 MG TABLET] 90 tablet 3     Sig: TAKE 1 TABLET BY MOUTH EVERY DAY AT NIGHT        Cholesterol Medication Protocol Passed - 11/26/2021 12:02 AM        Passed - ALT in past 12 months        Passed - LDL in past 12 months        Passed - Last ALT < 80       Lab Results   Component Value Date    ALT 75 (H) 10/01/2021             Passed - Last LDL < 130     Lab Results   Component Value Date    LDL 78 10/01/2021               Passed - Appointment in past 12 or next 3 months           LOSARTAN 25 MG Oral Tab [Pharmacy Med Name: LOSARTAN POTASSIUM 25 MG TAB] 180 tablet 3     Sig: TAKE 2 TABLETS BY MOUTH EVERY DAY        Hypertensive Medications Protocol Passed - 11/26/2021 12:02 AM        Passed - CMP or BMP in past 12 months        Passed - Appointment in past 6 or next 3 months        Passed - GFR Non- > 50     Lab Results   Component Value Date    GFRNAA 87 10/01/2021                           Recent Outpatient Visits              1 month ago Routine physical examination    [facility] [place], Audley [place], MD    Office Visit    8 months ago Type 2 diabetes mellitus without complication, without long-term current use of insulin [facility])    [facility] [place], Audley Rome, MD    Office Visit    1 year ago Routine physical examination    [facility], Gumaro Lara MD    Office Visit    1 year ago Controlled type 2 diabetes mellitus without complication, without long-term current use of insulin [facility])    [facility] [address] Dee Melendez MD    Office Visit    2 years ago Cerebrovascular accident (CVA), unspecified mechanism [facility])    [facility], [address] Dee Melendez MD    Office Visit

## 2022-01-22 RX ORDER — METFORMIN HYDROCHLORIDE 500 MG/1
500 TABLET, EXTENDED RELEASE ORAL
Qty: 90 TABLET | Refills: 1 | Status: SHIPPED | OUTPATIENT
Start: 2022-01-22 | End: 2022-07-18

## 2022-01-22 NOTE — TELEPHONE ENCOUNTER
Refill passed per [facility] protocol.   Requested Prescriptions   Pending Prescriptions Disp Refills    METFORMIN HCL  MG Oral Tablet 24 Hr [Pharmacy Med Name: METFORMIN HCL  MG TABLET] 90 tablet 3     Sig: TAKE 1 TABLET BY MOUTH EVERY DAY WITH EVENING MEAL        Diabetes Medication Protocol Passed - 1/22/2022 12:12 AM        Passed - Last A1C < 7.5 and within past 6 months     Lab Results   Component Value Date    A1C 6.6 (H) 10/01/2021               Passed - Appointment in past 6 or next 3 months        Passed - GFR Non- > 50     Lab Results   Component Value Date    GFRNAA 87 10/01/2021                 Passed - GFR in the past 12 months              Recent Outpatient Visits              3 months ago Routine physical examination    [facility], Tal Lee Ida Penner, MD    Office Visit    10 months ago Type 2 diabetes mellitus without complication, without long-term current use of insulin [facility])    [facility], Tal Lee Ida Penner, MD    Office Visit    1 year ago Routine physical examination    [facility], Tal Lee Ida Penner, MD    Office Visit    1 year ago Controlled type 2 diabetes mellitus without complication, without long-term current use of insulin [facility])    [facility], Neri Lee MD    Office Visit    2 years ago Cerebrovascular accident (CVA), unspecified mechanism [facility])    [facility], Neri Lee MD    Office Visit

## 2022-02-22 ENCOUNTER — TELEPHONE (OUTPATIENT)
Dept: FAMILY MEDICINE CLINIC | Facility: CLINIC | Age: 77
End: 2022-02-22

## 2022-03-16 RX ORDER — ESCITALOPRAM OXALATE 20 MG/1
20 TABLET ORAL DAILY
Qty: 90 TABLET | Refills: 3 | Status: SHIPPED | OUTPATIENT
Start: 2022-03-16 | End: 2023-03-06

## 2022-03-16 NOTE — TELEPHONE ENCOUNTER
There is a high alert notification that I am not permitted to override. pls advise, thanks in advance. Significance: Exceeds Daily, Single     Patient Information  Age: 77year old (27,654 days)  Weight: 73.7 kg [Weight as of Tue Oct 12, 2021 1457]  Sex: Female  Creatinine clearance could not be determined. Patient's most recent lab result is older than the maximum 7 days allowed. (Serum creatinine could not be determined.  Patient's most recent lab result is older than the maximum 7 days allowed.)   Diagnoses used to match Medical Conditions: Recurrent major depressive disorder, in partial remission (HCC)      Ordered daily dose: 20 mg (20 mg Daily)  Recommended daily dose range: 5 - 10 mg  Exceeds maximum by: 100% (10 mg)    Ordered single dose: 20 mg  Maximum single dose: 10 mg  Exceeds maximum by: 100% (10 mg)              Refill passed per [facility] protocol   Requested Prescriptions   Pending Prescriptions Disp Refills    ESCITALOPRAM 20 MG Oral Tab [Pharmacy Med Name: ESCITALOPRAM 20 MG TABLET] 90 tablet 1     Sig: TAKE 1 TABLET BY MOUTH EVERY DAY        Psychiatric Non-Scheduled (Anti-Anxiety) Passed - 3/16/2022 12:02 AM        Passed - Appointment in last 6 or next 3 months

## 2022-04-12 ENCOUNTER — LAB ENCOUNTER (OUTPATIENT)
Dept: LAB | Age: 77
End: 2022-04-12
Attending: FAMILY MEDICINE
Payer: MEDICARE

## 2022-04-12 DIAGNOSIS — E11.9 TYPE 2 DIABETES MELLITUS WITHOUT COMPLICATION, WITHOUT LONG-TERM CURRENT USE OF INSULIN (HCC): ICD-10-CM

## 2022-04-12 LAB
EST. AVERAGE GLUCOSE BLD GHB EST-MCNC: 143 MG/DL (ref 68–126)
HBA1C MFR BLD: 6.6 % (ref ?–5.7)

## 2022-04-12 PROCEDURE — 83036 HEMOGLOBIN GLYCOSYLATED A1C: CPT

## 2022-04-12 PROCEDURE — 36415 COLL VENOUS BLD VENIPUNCTURE: CPT

## 2022-04-18 ENCOUNTER — OFFICE VISIT (OUTPATIENT)
Dept: FAMILY MEDICINE CLINIC | Facility: CLINIC | Age: 77
End: 2022-04-18
Payer: MEDICARE

## 2022-04-18 VITALS
SYSTOLIC BLOOD PRESSURE: 114 MMHG | DIASTOLIC BLOOD PRESSURE: 74 MMHG | OXYGEN SATURATION: 95 % | HEART RATE: 80 BPM | BODY MASS INDEX: 27.33 KG/M2 | WEIGHT: 166 LBS | HEIGHT: 65.5 IN | TEMPERATURE: 96 F | RESPIRATION RATE: 15 BRPM

## 2022-04-18 DIAGNOSIS — R05.9 COUGH: ICD-10-CM

## 2022-04-18 DIAGNOSIS — G89.29 CHRONIC RIGHT-SIDED LOW BACK PAIN WITHOUT SCIATICA: ICD-10-CM

## 2022-04-18 DIAGNOSIS — M54.50 CHRONIC RIGHT-SIDED LOW BACK PAIN WITHOUT SCIATICA: ICD-10-CM

## 2022-04-18 DIAGNOSIS — E11.9 TYPE 2 DIABETES MELLITUS WITHOUT COMPLICATION, WITHOUT LONG-TERM CURRENT USE OF INSULIN (HCC): Primary | ICD-10-CM

## 2022-04-18 PROCEDURE — 99214 OFFICE O/P EST MOD 30 MIN: CPT | Performed by: FAMILY MEDICINE

## 2022-04-18 RX ORDER — PANTOPRAZOLE SODIUM 40 MG/1
40 TABLET, DELAYED RELEASE ORAL
Qty: 30 TABLET | Refills: 0 | Status: SHIPPED | OUTPATIENT
Start: 2022-04-18

## 2022-05-11 RX ORDER — PANTOPRAZOLE SODIUM 40 MG/1
40 TABLET, DELAYED RELEASE ORAL
Qty: 90 TABLET | Refills: 3 | Status: SHIPPED | OUTPATIENT
Start: 2022-05-11 | End: 2023-04-30

## 2022-05-11 NOTE — TELEPHONE ENCOUNTER
Refill passed per [address] protocol.   No refills given previously  Requested Prescriptions   Pending Prescriptions Disp Refills    PANTOPRAZOLE 40 MG Oral Tab EC [Pharmacy Med Name: PANTOPRAZOLE SOD DR 40 MG TAB] 30 tablet 0     Sig: TAKE 1 TABLET BY MOUTH EVERY DAY IN THE MORNING BEFORE BREAKFAST        Gastrointestional Medication Protocol Passed - 5/11/2022 10:34 AM        Passed - Appointment in past 12 or next 3 months              Recent Outpatient Visits              3 weeks ago Type 2 diabetes mellitus without complication, without long-term current use of insulin [facility])    [address], [address], Mady Lara MD    Office Visit    7 months ago Routine physical examination    [address], [address], Mady Lara MD    Office Visit    1 year ago Type 2 diabetes mellitus without complication, without long-term current use of insulin [facility])    [address], [address] Cj Harkins MD    Office Visit    1 year ago Routine physical examination    [address], [address] Cj Harkins MD    Office Visit    2 years ago Controlled type 2 diabetes mellitus without complication, without long-term current use of insulin [facility])    [address], [address] Cj Harkins MD    Office Visit

## 2022-05-21 RX ORDER — ATORVASTATIN CALCIUM 80 MG/1
TABLET, FILM COATED ORAL
Qty: 90 TABLET | Refills: 1 | Status: SHIPPED | OUTPATIENT
Start: 2022-05-21 | End: 2022-11-19

## 2022-05-21 RX ORDER — LOSARTAN POTASSIUM 25 MG/1
TABLET ORAL
Qty: 180 TABLET | Refills: 1 | Status: SHIPPED | OUTPATIENT
Start: 2022-05-21 | End: 2022-11-19

## 2022-05-21 NOTE — TELEPHONE ENCOUNTER
Refill passed per [facility] protocol.     Requested Prescriptions   Pending Prescriptions Disp Refills    ATORVASTATIN 80 MG Oral Tab [Pharmacy Med Name: ATORVASTATIN 80 MG TABLET] 90 tablet 1     Sig: TAKE 1 TABLET BY MOUTH EVERY DAY AT NIGHT        Cholesterol Medication Protocol Passed - 5/21/2022 12:12 AM        Passed - ALT in past 12 months        Passed - LDL in past 12 months        Passed - Last ALT < 80       Lab Results   Component Value Date    ALT 75 (H) 10/01/2021             Passed - Last LDL < 130     Lab Results   Component Value Date    LDL 78 10/01/2021               Passed - Appointment in past 12 or next 3 months           LOSARTAN 25 MG Oral Tab [Pharmacy Med Name: LOSARTAN POTASSIUM 25 MG TAB] 180 tablet 1     Sig: TAKE 2 TABLETS BY MOUTH EVERY DAY        Hypertensive Medications Protocol Passed - 5/21/2022 12:12 AM        Passed - CMP or BMP in past 12 months        Passed - Appointment in past 6 or next 3 months        Passed - GFR Non- > 50     Lab Results   Component Value Date    GFRNAA 87 10/01/2021                           Recent Outpatient Visits              1 month ago Type 2 diabetes mellitus without complication, without long-term current use of insulin [facility])    [facility], Tal Calderon Audley Rome, MD    Office Visit    7 months ago Routine physical examination    [facility] [place], Audley [place], [place]    Office Visit    1 year ago Type 2 diabetes mellitus without complication, without long-term current use of insulin [facility])    [facility], N[address] Dee Melendez MD    Office Visit    1 year ago Routine physical examination    [facility] [address] Dee Melendez MD    Office Visit    2 years ago Controlled type 2 diabetes mellitus without complication, without long-term current use of insulin [facility])    [facility], [address] Dee Melendez MD    Office Visit

## 2022-07-18 RX ORDER — METFORMIN HYDROCHLORIDE 500 MG/1
TABLET, EXTENDED RELEASE ORAL
Qty: 90 TABLET | Refills: 3 | Status: SHIPPED | OUTPATIENT
Start: 2022-07-18 | End: 2023-07-13

## 2022-08-25 RX ORDER — HYDROCHLOROTHIAZIDE 12.5 MG/1
12.5 TABLET ORAL DAILY
Qty: 90 TABLET | Refills: 1 | Status: SHIPPED | OUTPATIENT
Start: 2022-08-25

## 2022-08-25 NOTE — TELEPHONE ENCOUNTER
Please review. Protocol failed. Requested Prescriptions   Pending Prescriptions Disp Refills    HYDROCHLOROTHIAZIDE 12.5 MG Oral Tab [Pharmacy Med Name: HYDROCHLOROTHIAZIDE 12.5 MG TB] 90 tablet 3     Sig: TAKE 1 TABLET BY MOUTH EVERY DAY        Hypertensive Medications Protocol Failed - 8/25/2022 12:02 AM        Failed - CMP or BMP in past 6 months     No results found for this or any previous visit (from the past 4392 hour(s)).               Passed - In person appointment in the past 12 or next 3 months       Recent Outpatient Visits              4 months ago Type 2 diabetes mellitus without complication, without long-term current use of insulin [facility])    [address] [place], [address], Jamal Lara MD    Office Visit    10 months ago Routine physical examination    [address] [place], [address], Jamal Lara MD    Office Visit    1 year ago Type 2 diabetes mellitus without complication, without long-term current use of insulin [facility])    [address], Neri Hall MD    Office Visit    2 years ago Routine physical examination    [address], [address] Vielka Hall MD    Office Visit    2 years ago Controlled type 2 diabetes mellitus without complication, without long-term current use of insulin [facility])    [address], [address] [facility] MD Danyel    Office Visit                 Passed - Last BP reading less than 140/90     BP Readings from Last 1 Encounters:  04/18/22 : Viri Horner - In person appointment or virtual visit in the past 6 months       Recent Outpatient Visits              4 months ago Type 2 diabetes mellitus without complication, without long-term current use of insulin [facility])    [address] Vielka Hall MD    Office Visit    10 months ago Routine physical examination    [address], [address] Vielka Hall MD    Office Visit    1 year ago Type 2 diabetes mellitus without complication, without long-term current use of insulin [facility])    [address] [place], [address], Mabel Lara MD    Office Visit    2 years ago Routine physical examination    [address] [place], [address], Mabel Lara MD    Office Visit    2 years ago Controlled type 2 diabetes mellitus without complication, without long-term current use of insulin [facility])    [address] [place], [address], Mabel Lara MD    Office Visit                 Passed - GFR > 50     No results found for: [facility]                    Recent Outpatient Visits              4 months ago Type 2 diabetes mellitus without complication, without long-term current use of insulin [facility])    [address] [place], [address], Mabel Lara MD    Office Visit    10 months ago Routine physical examination    [address] [place], [address], Mabel Lara MD    Office Visit    1 year ago Type 2 diabetes mellitus without complication, without long-term current use of insulin [facility])    [address], Denia Milian MD    Office Visit    2 years ago Routine physical examination    [address], Denia Milian MD    Office Visit    2 years ago Controlled type 2 diabetes mellitus without complication, without long-term current use of insulin [facility])    [address], Denia Milian MD    Office Visit

## 2022-10-05 ENCOUNTER — OFFICE VISIT (OUTPATIENT)
Dept: GASTROENTEROLOGY | Facility: CLINIC | Age: 77
End: 2022-10-05
Payer: MEDICARE

## 2022-10-05 ENCOUNTER — TELEPHONE (OUTPATIENT)
Dept: GASTROENTEROLOGY | Facility: CLINIC | Age: 77
End: 2022-10-05

## 2022-10-05 VITALS
SYSTOLIC BLOOD PRESSURE: 130 MMHG | DIASTOLIC BLOOD PRESSURE: 75 MMHG | WEIGHT: 164 LBS | HEIGHT: 65 IN | HEART RATE: 80 BPM | BODY MASS INDEX: 27.32 KG/M2

## 2022-10-05 DIAGNOSIS — Z12.11 COLON CANCER SCREENING: Primary | ICD-10-CM

## 2022-10-05 DIAGNOSIS — Z12.11 COLON CANCER SCREENING: ICD-10-CM

## 2022-10-05 DIAGNOSIS — R05.9 COUGH, UNSPECIFIED TYPE: ICD-10-CM

## 2022-10-05 DIAGNOSIS — K59.00 CONSTIPATION, UNSPECIFIED CONSTIPATION TYPE: ICD-10-CM

## 2022-10-05 DIAGNOSIS — R74.01 ELEVATED ALT MEASUREMENT: Primary | ICD-10-CM

## 2022-10-05 PROCEDURE — 1126F AMNT PAIN NOTED NONE PRSNT: CPT | Performed by: NURSE PRACTITIONER

## 2022-10-05 PROCEDURE — 99204 OFFICE O/P NEW MOD 45 MIN: CPT | Performed by: NURSE PRACTITIONER

## 2022-10-05 RX ORDER — SODIUM, POTASSIUM,MAG SULFATES 17.5-3.13G
SOLUTION, RECONSTITUTED, ORAL ORAL
Qty: 2880 ML | Refills: 0 | Status: SHIPPED | OUTPATIENT
Start: 2022-10-05

## 2022-10-05 RX ORDER — POLYETHYLENE GLYCOL 3350, SODIUM CHLORIDE, SODIUM BICARBONATE, POTASSIUM CHLORIDE 420; 11.2; 5.72; 1.48 G/4L; G/4L; G/4L; G/4L
POWDER, FOR SOLUTION ORAL
Qty: 4000 ML | Refills: 0 | Status: SHIPPED | OUTPATIENT
Start: 2022-10-05

## 2022-10-05 RX ORDER — ONDANSETRON 4 MG/1
4 TABLET, ORALLY DISINTEGRATING ORAL EVERY 8 HOURS PRN
Qty: 3 TABLET | Refills: 0 | Status: SHIPPED | OUTPATIENT
Start: 2022-10-05

## 2022-10-05 RX ORDER — POLYMYXIN B SULFATE AND TRIMETHOPRIM 1; 10000 MG/ML; [USP'U]/ML
SOLUTION OPHTHALMIC
COMMUNITY
Start: 2022-09-19

## 2022-10-05 NOTE — TELEPHONE ENCOUNTER
Scheduled for:  Colonoscopy [address]/EGD [address]  Provider Name: Dr Marko Cr   Date:  Aiyana Joseph 11/29/2022  Location: [facility]  Sedation: MAC   Time: 10 am, (pt is aware that [facility] will call the day before to confirm arrival time)  Prep: split trilyte   Meds/Allergies Reconciled?: NKDA   Diagnosis with codes: CRC screening Z12.11, cough R05.9  Was patient informed to call insurance with codes (Y/N):  Yes  Referral sent?:  Yes  [facility] or [address] [place] [address] notified?:  Electronic case request was sent to [facility] via [email or web address]. Medication Orders: Hold Losartan am of procedure  Hold Metformin day before and day of procedure  Pt is aware to NOT take iron pills, herbal meds and diet supplements for 7 days before exam. Also to NOT take any form of alcohol, recreational drugs and any forms of ED meds 24 hours before exam.      Misc Orders:  Patient was informed that they will need a COVID 19 test prior to their procedure. Patient verbally understood & will await a phone call from [facility] to schedule. Further instructions given by staff:  Instructions given and pt verbalized understanding

## 2022-10-28 ENCOUNTER — LAB ENCOUNTER (OUTPATIENT)
Dept: LAB | Age: 77
End: 2022-10-28
Attending: FAMILY MEDICINE
Payer: MEDICARE

## 2022-10-28 DIAGNOSIS — E11.9 TYPE 2 DIABETES MELLITUS WITHOUT COMPLICATION, WITHOUT LONG-TERM CURRENT USE OF INSULIN (HCC): ICD-10-CM

## 2022-10-28 DIAGNOSIS — R74.01 ELEVATED ALT MEASUREMENT: ICD-10-CM

## 2022-10-28 LAB
ALBUMIN SERPL-MCNC: 3.6 G/DL (ref 3.4–5)
ALBUMIN/GLOB SERPL: 1.2 {RATIO} (ref 1–2)
ALP LIVER SERPL-CCNC: 78 U/L
ALT SERPL-CCNC: 49 U/L
ANION GAP SERPL CALC-SCNC: 6 MMOL/L (ref 0–18)
AST SERPL-CCNC: 18 U/L (ref 15–37)
BILIRUB DIRECT SERPL-MCNC: 0.2 MG/DL (ref 0–0.2)
BILIRUB SERPL-MCNC: 0.7 MG/DL (ref 0.1–2)
BUN BLD-MCNC: 17 MG/DL (ref 7–18)
BUN/CREAT SERPL: 23.9 (ref 10–20)
CALCIUM BLD-MCNC: 10.1 MG/DL (ref 8.5–10.1)
CHLORIDE SERPL-SCNC: 109 MMOL/L (ref 98–112)
CHOLEST SERPL-MCNC: 149 MG/DL (ref ?–200)
CO2 SERPL-SCNC: 27 MMOL/L (ref 21–32)
CREAT BLD-MCNC: 0.71 MG/DL
CREAT UR-SCNC: 159 MG/DL
DEPRECATED RDW RBC AUTO: 45.5 FL (ref 35.1–46.3)
ERYTHROCYTE [DISTWIDTH] IN BLOOD BY AUTOMATED COUNT: 13.2 % (ref 11–15)
EST. AVERAGE GLUCOSE BLD GHB EST-MCNC: 154 MG/DL (ref 68–126)
FASTING PATIENT LIPID ANSWER: NO
FASTING STATUS PATIENT QL REPORTED: NO
GFR SERPLBLD BASED ON 1.73 SQ M-ARVRAT: 88 ML/MIN/1.73M2 (ref 60–?)
GLOBULIN PLAS-MCNC: 2.9 G/DL (ref 2.8–4.4)
GLUCOSE BLD-MCNC: 166 MG/DL (ref 70–99)
HBA1C MFR BLD: 7 % (ref ?–5.7)
HCT VFR BLD AUTO: 45.3 %
HDLC SERPL-MCNC: 61 MG/DL (ref 40–59)
HGB BLD-MCNC: 15 G/DL
LDLC SERPL CALC-MCNC: 71 MG/DL (ref ?–100)
MCH RBC QN AUTO: 31.1 PG (ref 26–34)
MCHC RBC AUTO-ENTMCNC: 33.1 G/DL (ref 31–37)
MCV RBC AUTO: 93.8 FL
MICROALBUMIN UR-MCNC: 1.66 MG/DL
MICROALBUMIN/CREAT 24H UR-RTO: 10.4 UG/MG (ref ?–30)
NONHDLC SERPL-MCNC: 88 MG/DL (ref ?–130)
OSMOLALITY SERPL CALC.SUM OF ELEC: 299 MOSM/KG (ref 275–295)
PLATELET # BLD AUTO: 256 10(3)UL (ref 150–450)
POTASSIUM SERPL-SCNC: 3.9 MMOL/L (ref 3.5–5.1)
PROT SERPL-MCNC: 6.5 G/DL (ref 6.4–8.2)
RBC # BLD AUTO: 4.83 X10(6)UL
SODIUM SERPL-SCNC: 142 MMOL/L (ref 136–145)
TRIGL SERPL-MCNC: 92 MG/DL (ref 30–149)
TSI SER-ACNC: 1.9 MIU/ML (ref 0.36–3.74)
VLDLC SERPL CALC-MCNC: 14 MG/DL (ref 0–30)
WBC # BLD AUTO: 5.2 X10(3) UL (ref 4–11)

## 2022-10-28 PROCEDURE — 82248 BILIRUBIN DIRECT: CPT

## 2022-10-28 PROCEDURE — 82570 ASSAY OF URINE CREATININE: CPT

## 2022-10-28 PROCEDURE — 83036 HEMOGLOBIN GLYCOSYLATED A1C: CPT

## 2022-10-28 PROCEDURE — 84443 ASSAY THYROID STIM HORMONE: CPT

## 2022-10-28 PROCEDURE — 85027 COMPLETE CBC AUTOMATED: CPT

## 2022-10-28 PROCEDURE — 82043 UR ALBUMIN QUANTITATIVE: CPT

## 2022-10-28 PROCEDURE — 80053 COMPREHEN METABOLIC PANEL: CPT

## 2022-10-28 PROCEDURE — 36415 COLL VENOUS BLD VENIPUNCTURE: CPT

## 2022-10-28 PROCEDURE — 80061 LIPID PANEL: CPT

## 2022-11-08 ENCOUNTER — OFFICE VISIT (OUTPATIENT)
Dept: FAMILY MEDICINE CLINIC | Facility: CLINIC | Age: 77
End: 2022-11-08
Payer: MEDICARE

## 2022-11-08 VITALS
HEIGHT: 66.14 IN | TEMPERATURE: 98 F | HEART RATE: 87 BPM | WEIGHT: 162.81 LBS | BODY MASS INDEX: 26.17 KG/M2 | SYSTOLIC BLOOD PRESSURE: 120 MMHG | DIASTOLIC BLOOD PRESSURE: 67 MMHG

## 2022-11-08 DIAGNOSIS — Z00.00 ENCOUNTER FOR ANNUAL HEALTH EXAMINATION: Primary | ICD-10-CM

## 2022-11-08 DIAGNOSIS — Z86.73 HISTORY OF CVA (CEREBROVASCULAR ACCIDENT): ICD-10-CM

## 2022-11-08 DIAGNOSIS — G91.2 NORMAL PRESSURE HYDROCEPHALUS (HCC): ICD-10-CM

## 2022-11-08 DIAGNOSIS — K63.5 POLYP OF COLON, UNSPECIFIED PART OF COLON, UNSPECIFIED TYPE: ICD-10-CM

## 2022-11-08 DIAGNOSIS — F33.41 RECURRENT MAJOR DEPRESSIVE DISORDER, IN PARTIAL REMISSION (HCC): ICD-10-CM

## 2022-11-08 DIAGNOSIS — M17.11 PRIMARY OSTEOARTHRITIS OF RIGHT KNEE: ICD-10-CM

## 2022-11-08 DIAGNOSIS — Z78.0 ASYMPTOMATIC MENOPAUSAL STATE: ICD-10-CM

## 2022-11-08 DIAGNOSIS — E78.5 HYPERLIPIDEMIA, UNSPECIFIED HYPERLIPIDEMIA TYPE: ICD-10-CM

## 2022-11-08 DIAGNOSIS — Z12.31 BREAST CANCER SCREENING BY MAMMOGRAM: ICD-10-CM

## 2022-11-08 DIAGNOSIS — E11.9 TYPE 2 DIABETES MELLITUS WITHOUT COMPLICATION, WITHOUT LONG-TERM CURRENT USE OF INSULIN (HCC): ICD-10-CM

## 2022-11-08 DIAGNOSIS — M85.80 OSTEOPENIA, UNSPECIFIED LOCATION: ICD-10-CM

## 2022-11-08 DIAGNOSIS — Z12.31 ENCOUNTER FOR SCREENING MAMMOGRAM FOR BREAST CANCER: ICD-10-CM

## 2022-11-08 DIAGNOSIS — E89.0 POSTABLATIVE HYPOTHYROIDISM: ICD-10-CM

## 2022-11-08 PROBLEM — M54.50 CHRONIC RIGHT-SIDED LOW BACK PAIN WITHOUT SCIATICA: Status: RESOLVED | Noted: 2018-09-05 | Resolved: 2022-11-08

## 2022-11-08 PROBLEM — G89.29 CHRONIC RIGHT-SIDED LOW BACK PAIN WITHOUT SCIATICA: Status: RESOLVED | Noted: 2018-09-05 | Resolved: 2022-11-08

## 2022-11-08 PROCEDURE — 1125F AMNT PAIN NOTED PAIN PRSNT: CPT | Performed by: FAMILY MEDICINE

## 2022-11-08 PROCEDURE — G0008 ADMIN INFLUENZA VIRUS VAC: HCPCS | Performed by: FAMILY MEDICINE

## 2022-11-08 PROCEDURE — 90662 IIV NO PRSV INCREASED AG IM: CPT | Performed by: FAMILY MEDICINE

## 2022-11-08 PROCEDURE — G0439 PPPS, SUBSEQ VISIT: HCPCS | Performed by: FAMILY MEDICINE

## 2022-11-19 RX ORDER — LOSARTAN POTASSIUM 25 MG/1
TABLET ORAL
Qty: 180 TABLET | Refills: 1 | Status: SHIPPED | OUTPATIENT
Start: 2022-11-19

## 2022-11-19 RX ORDER — ATORVASTATIN CALCIUM 80 MG/1
TABLET, FILM COATED ORAL
Qty: 90 TABLET | Refills: 1 | Status: SHIPPED | OUTPATIENT
Start: 2022-11-19

## 2022-11-19 NOTE — TELEPHONE ENCOUNTER
[facility]      Requested Prescriptions   Pending Prescriptions Disp Refills    ATORVASTATIN 80 MG Oral Tab [Pharmacy Med Name: ATORVASTATIN 80 MG TABLET] 90 tablet 1     Sig: TAKE 1 TABLET BY MOUTH EVERY DAY AT NIGHT       Cholesterol Medication Protocol Passed - 11/18/2022  1:12 AM        Passed - ALT in past 12 months        Passed - LDL in past 12 months        Passed - Last ALT < 80     Lab Results   Component Value Date    ALT 49 10/28/2022             Passed - Last LDL < 130     Lab Results   Component Value Date    LDL 71 10/28/2022             Passed - In person appointment or virtual visit in the past 12 mos or appointment in next 3 mos     Recent Outpatient Visits              1 week ago Encounter for annual health examination    [facility], Kurtis Espino, Chrystal Lara MD    Office Visit    1 month ago Elevated ALT measurement    [facility], Kurt[address] Espino, Fátima Bee APRN    Office Visit    7 months ago Type 2 diabetes mellitus without complication, without long-term current use of insulin [facility])    [facility], Elmer Lee MD    Office Visit    1 year ago Routine physical examination    [facility], Kurt[address] Espino, Elmer Briggs MD    Office Visit    1 year ago Type 2 diabetes mellitus without complication, without long-term current use of insulin [facility])    [facility], Elmer Lee MD    Office Visit          Future Appointments         Provider Department Appt Notes    In 1 week [address] Colonoscopy/EGD @ [address] 25 MG Oral Tab [Pharmacy Med Name: LOSARTAN POTASSIUM 25 MG TAB] 180 tablet 1     Sig: TAKE 2 TABLETS BY MOUTH EVERY DAY       Hypertensive Medications Protocol Passed - 11/18/2022  1:12 AM        Passed - In person appointment in the past 12 or next 3 months     Recent Outpatient Visits              1 week ago Encounter for annual health examination    [facility], [address] Ben Nicholas MD    Office Visit    1 month ago Elevated ALT measurement    [address] CLAUDINE Lechuga    Office Visit    7 months ago Type 2 diabetes mellitus without complication, without long-term current use of insulin [facility])    [address] MD    Office Visit    1 year ago Routine physical examination    [facility], Charissa Bautista MD    Office Visit    1 year ago Type 2 diabetes mellitus without complication, without long-term current use of insulin [facility])    [place] [facility], Junito Calderon MD    Office Visit          Future Appointments         Provider Department Appt Notes    In 1 week ANIA, PROCEDURE [address] GI PROCEDURE Colonoscopy/EGD @ [facility]               Passed - Last BP reading less than 140/90     BP Readings from Last 1 Encounters:  11/08/22 : 120/67              Passed - CMP or BMP in past 6 months     Recent Results (from the past 4392 hour(s))   COMP METABOLIC PANEL (14)    Collection Time: 10/28/22 10:25 AM   Result Value Ref Range    Glucose 166 (H) 70 - 99 mg/dL    Sodium 142 136 - 145 mmol/L    Potassium 3.9 3.5 - 5.1 mmol/L    Chloride 109 98 - 112 mmol/L    CO2 27.0 21.0 - 32.0 mmol/L    Anion Gap 6 0 - 18 mmol/L    BUN 17 7 - 18 mg/dL    Creatinine 0.71 0.55 - 1.02 mg/dL    BUN/CREA Ratio 23.9 (H) 10.0 - 20.0    Calcium, Total 10.1 8.5 - 10.1 mg/dL    Calculated Osmolality 299 (H) 275 - 295 mOsm/kg    eGFR-Cr 88 >=60 mL/min/1.73m2    ALT 49 13 - 56 U/L    AST 18 15 - 37 U/L    Alkaline Phosphatase 78 55 - 142 U/L    Bilirubin, Total 0.7 0.1 - 2.0 mg/dL    Total Protein 6.5 6.4 - 8.2 g/dL    Albumin 3.6 3.4 - 5.0 g/dL    Globulin  2.9 2.8 - 4.4 g/dL    A/G Ratio 1.2 1.0 - 2.0    Patient Fasting for CMP?  No      *Note: Due to a large number of results and/or encounters for the requested time period, some results have not been displayed. A complete set of results can be found in Results Review.                Passed - In person appointment or virtual visit in the past 6 months     Recent Outpatient Visits              1 week ago Encounter for annual health examination    [facility]    Office Visit    1 month ago Elevated ALT measurement    [facility], Kurtis Espino, Kimberly Bee, CLAUDINE    Office Visit    7 months ago Type 2 diabetes mellitus without complication, without long-term current use of insulin [facility])    [facility], Kurtis Espino, Camilo Lara MD    Office Visit    1 year ago Routine physical examination    [facility]    Office Visit    1 year ago Type 2 diabetes mellitus without complication, without long-term current use of insulin [facility])    [facility], Kurtis Espino, [facility] Yoni Alves MD    Office Visit          Future Appointments         Provider Department Appt Notes    In 1 week ARELI [address] Colonoscopy/EGD @ [address] or GFRNAA > 50     GFR Evaluation  EGFRCR: 88 , resulted on 10/28/2022                Recent Outpatient Visits              1 week ago Encounter for annual health examination    [facility], Kurtis Espino, Camilo Lara MD    Office Visit    1 month ago Elevated ALT measurement    [address] Seldon Maizes, APRN    Office Visit    7 months ago Type 2 diabetes mellitus without complication, without long-term current use of insulin [facility])    [facility], Kurtis Espino, Yeimi Miles MD    Office Visit    1 year ago Routine physical examination    [facility], Yeimi Lee MD    Office Visit    1 year ago Type 2 diabetes mellitus without complication, without long-term current use of insulin ([address] Freeda Cockayne, MD    Office Visit             Future Appointments         Provider Department Appt Notes    In 1 week ANIA, PROCEDURE [address] GI PROCEDURE Colonoscopy/EGD @ [facility]

## 2022-11-29 ENCOUNTER — LAB REQUISITION (OUTPATIENT)
Dept: SURGERY | Age: 77
End: 2022-11-29
Payer: MEDICARE

## 2022-11-29 ENCOUNTER — SURGERY CENTER DOCUMENTATION (OUTPATIENT)
Dept: SURGERY | Age: 77
End: 2022-11-29

## 2022-11-29 DIAGNOSIS — Z12.11 SPECIAL SCREENING FOR MALIGNANT NEOPLASMS, COLON: ICD-10-CM

## 2022-11-29 DIAGNOSIS — R05.9 COUGH, UNSPECIFIED TYPE: ICD-10-CM

## 2022-11-29 PROCEDURE — 88312 SPECIAL STAINS GROUP 1: CPT | Performed by: INTERNAL MEDICINE

## 2022-11-29 PROCEDURE — G0121 COLON CA SCRN NOT HI RSK IND: HCPCS | Performed by: INTERNAL MEDICINE

## 2022-11-29 PROCEDURE — 88305 TISSUE EXAM BY PATHOLOGIST: CPT | Performed by: INTERNAL MEDICINE

## 2022-11-29 PROCEDURE — 43239 EGD BIOPSY SINGLE/MULTIPLE: CPT | Performed by: INTERNAL MEDICINE

## 2022-11-30 ENCOUNTER — TELEPHONE (OUTPATIENT)
Dept: GASTROENTEROLOGY | Facility: CLINIC | Age: 77
End: 2022-11-30

## 2022-11-30 PROBLEM — K20.90 BARRETT'S ESOPHAGUS WITH ESOPHAGITIS: Status: ACTIVE | Noted: 2022-11-30

## 2022-11-30 PROBLEM — K22.70 BARRETT'S ESOPHAGUS WITH ESOPHAGITIS: Status: ACTIVE | Noted: 2022-11-30

## 2022-11-30 NOTE — TELEPHONE ENCOUNTER
Specialty comments updated. 1 year EGD recall placed in patient outreach. Next due 11/29/2023  Per Dr. Jennifer Spurling.

## 2022-12-05 ENCOUNTER — MED REC SCAN ONLY (OUTPATIENT)
Facility: CLINIC | Age: 77
End: 2022-12-05

## 2022-12-08 RX ORDER — LEVOTHYROXINE SODIUM 0.1 MG/1
100 TABLET ORAL DAILY
Qty: 90 TABLET | Refills: 1 | Status: SHIPPED | OUTPATIENT
Start: 2022-12-08

## 2022-12-08 NOTE — TELEPHONE ENCOUNTER
Refill passed per [facility] protocol.      Requested Prescriptions   Pending Prescriptions Disp Refills    LEVOTHYROXINE 100 MCG Oral Tab [Pharmacy Med Name: LEVOTHYROXINE 100 MCG TABLET] 90 tablet 3     Sig: TAKE 1 TABLET BY MOUTH EVERY DAY       Thyroid Medication Protocol Passed - 12/8/2022 12:29 AM        Passed - TSH in past 12 months        Passed - Last TSH value is normal     Lab Results   Component Value Date    TSH 1.900 10/28/2022                 Passed - In person appointment or virtual visit in the past 12 mos or appointment in next 3 mos     Recent Outpatient Visits              1 month ago Encounter for annual health examination    [facility], Tal Calderon Cherryl [place], MD    Office Visit    2 months ago Elevated ALT measurement    [facility] [address], Lorelie Morelle, APRN    Office Visit    7 months ago Type 2 diabetes mellitus without complication, without long-term current use of insulin [facility])    [facility], Tal Calderon Cherryl [place], MD    Office Visit    1 year ago Routine physical examination    [facility] [place], Cherryl [place], MD    Office Visit    1 year ago Type 2 diabetes mellitus without complication, without long-term current use of insulin [facility])    [facility], Tal Calderon Cherryl [place], MD    Office Visit                            Recent Outpatient Visits              1 month ago Encounter for annual health examination    [facility], Tal Calderon Cherryl [place], MD    Office Visit    2 months ago Elevated ALT measurement    [facility], [address] Calder[address] Lorelie Morelle, APRN    Office Visit    7 months ago Type 2 diabetes mellitus without complication, without long-term current use of insulin [facility])    [facility], Neri Calderon MD    Office Visit    1 year ago Routine physical examination    [facility], [place], [place], Tevin Ward MD    Office Visit    1 year ago Type 2 diabetes mellitus without complication, without long-term current use of insulin [facility])    [address], [facility] Moe Lemon MD    Office Visit

## 2023-01-11 ENCOUNTER — TELEPHONE (OUTPATIENT)
Dept: FAMILY MEDICINE CLINIC | Facility: CLINIC | Age: 78
End: 2023-01-11

## 2023-01-11 DIAGNOSIS — Z91.89 AT HIGH RISK FOR FRACTURE: ICD-10-CM

## 2023-01-11 DIAGNOSIS — M85.89 OSTEOPENIA OF MULTIPLE SITES: Primary | ICD-10-CM

## 2023-01-11 RX ORDER — ALENDRONATE SODIUM 70 MG/1
70 TABLET ORAL
Qty: 12 TABLET | Refills: 3 | Status: SHIPPED | OUTPATIENT
Start: 2023-01-11

## 2023-02-20 RX ORDER — HYDROCHLOROTHIAZIDE 12.5 MG/1
12.5 TABLET ORAL DAILY
Qty: 90 TABLET | Refills: 1 | Status: SHIPPED | OUTPATIENT
Start: 2023-02-20

## 2023-02-20 NOTE — TELEPHONE ENCOUNTER
Refill passed per [employer] protocol.     .  Requested Prescriptions   Pending Prescriptions Disp Refills    HYDROCHLOROTHIAZIDE 12.5 MG Oral Tab [Pharmacy Med Name: HYDROCHLOROTHIAZIDE 12.5 MG TB] 90 tablet 1     Sig: TAKE 1 TABLET BY MOUTH EVERY DAY       Hypertensive Medications Protocol Passed - 2/19/2023 12:30 AM        Passed - In person appointment in the past 12 or next 3 months     Recent Outpatient Visits              3 months ago Encounter for annual health examination    [place] [place], Neri Theodore MD    Office Visit    4 months ago Elevated ALT measurement    [address] [facility], CLAUDINE Rubalcava    Office Visit    10 months ago Type 2 diabetes mellitus without complication, without long-term current use of insulin ([address]    [place] [place], Teresa Liriano MD    Office Visit    1 year ago Routine physical examination    [place] [place], Neri Theodore MD    Office Visit    1 year ago Type 2 diabetes mellitus without complication, without long-term current use of insulin [facility])    [place] [place], Neri Theodore MD    Office Visit                      Passed - Last BP reading less than 140/90     BP Readings from Last 1 Encounters:  11/08/22 : 120/67              Passed - CMP or BMP in past 6 months     Recent Results (from the past 4392 hour(s))   COMP METABOLIC PANEL (14)    Collection Time: 10/28/22 10:25 AM   Result Value Ref Range    Glucose 166 (H) 70 - 99 mg/dL    Sodium 142 136 - 145 mmol/L    Potassium 3.9 3.5 - 5.1 mmol/L    Chloride 109 98 - 112 mmol/L    CO2 27.0 21.0 - 32.0 mmol/L    Anion Gap 6 0 - 18 mmol/L    BUN 17 7 - 18 mg/dL    Creatinine 0.71 0.55 - 1.02 mg/dL    BUN/CREA Ratio 23.9 (H) 10.0 - 20.0    Calcium, Total 10.1 8.5 - 10.1 mg/dL    Calculated Osmolality 299 (H) 275 - 295 mOsm/kg eGFR-Cr 88 >=60 mL/min/1.73m2    ALT 49 13 - 56 U/L    AST 18 15 - 37 U/L    Alkaline Phosphatase 78 55 - 142 U/L    Bilirubin, Total 0.7 0.1 - 2.0 mg/dL    Total Protein 6.5 6.4 - 8.2 g/dL    Albumin 3.6 3.4 - 5.0 g/dL    Globulin  2.9 2.8 - 4.4 g/dL    A/G Ratio 1.2 1.0 - 2.0    Patient Fasting for CMP? No      *Note: Due to a large number of results and/or encounters for the requested time period, some results have not been displayed. A complete set of results can be found in Results Review.                Passed - In person appointment or virtual visit in the past 6 months     Recent Outpatient Visits              3 months ago Encounter for annual health examination    [address] [facility], [facility] Hemanth Garcia MD    Office Visit    4 months ago Elevated ALT measurement    [address] [facility], [facility], APR[facility]    Office Visit    10 months ago Type 2 diabetes mellitus without complication, without long-term current use of insulin [facility])    [address], [address], [facility] Hemanth Garcia MD    Office Visit    1 year ago Routine physical examination    [address] [facility], [place], Chinmay Solis MD    Office Visit    1 year ago Type 2 diabetes mellitus without complication, without long-term current use of insulin [facility])    [address], [address], [place], Chinmay Solis MD    Office Visit                      Passed - [facility] or GFRNAA > 50     GFR Evaluation  EGFRCR: 88 , resulted on 10/28/2022               Recent Outpatient Visits              3 months ago Encounter for annual health examination    [address] [facility], [facility] MD Danyel    Office Visit    4 months ago Elevated ALT measurement    [address] [facility], [address] [facility], APRN    Office Visit    10 months ago Type 2 diabetes mellitus without complication, without long-term current use of insulin [facility])    Carmelo Gregg MD    Office Visit    1 year ago Routine physical examination    Rashmi Patino MD    Office Visit    1 year ago Type 2 diabetes mellitus without complication, without long-term current use of insulin [facility])    [address] Esp[address] MD    Office Visit

## 2023-02-21 ENCOUNTER — LAB ENCOUNTER (OUTPATIENT)
Dept: LAB | Age: 78
End: 2023-02-21
Attending: FAMILY MEDICINE
Payer: MEDICARE

## 2023-02-21 DIAGNOSIS — Z91.89 AT HIGH RISK FOR FRACTURE: ICD-10-CM

## 2023-02-21 DIAGNOSIS — M85.89 OSTEOPENIA OF MULTIPLE SITES: ICD-10-CM

## 2023-02-21 DIAGNOSIS — E11.9 TYPE 2 DIABETES MELLITUS WITHOUT COMPLICATION, WITHOUT LONG-TERM CURRENT USE OF INSULIN (HCC): ICD-10-CM

## 2023-02-21 LAB
EST. AVERAGE GLUCOSE BLD GHB EST-MCNC: 146 MG/DL (ref 68–126)
HBA1C MFR BLD: 6.7 % (ref ?–5.7)

## 2023-02-21 PROCEDURE — 83036 HEMOGLOBIN GLYCOSYLATED A1C: CPT

## 2023-02-21 PROCEDURE — 84080 ASSAY ALKALINE PHOSPHATASES: CPT

## 2023-02-21 PROCEDURE — 36415 COLL VENOUS BLD VENIPUNCTURE: CPT

## 2023-02-24 LAB — BONE SPECIFIC ALKALINE PHOSPHATASE: 9.6 UG/L

## 2023-03-06 RX ORDER — ESCITALOPRAM OXALATE 20 MG/1
20 TABLET ORAL DAILY
Qty: 90 TABLET | Refills: 3 | Status: SHIPPED | OUTPATIENT
Start: 2023-03-06

## 2023-03-06 NOTE — TELEPHONE ENCOUNTER
Refill passed per [employer] protocol but unable to refill due to high level interaction warning.      Requested Prescriptions   Pending Prescriptions Disp Refills    ESCITALOPRAM 20 MG Oral Tab [Pharmacy Med Name: ESCITALOPRAM 20 MG TABLET] 90 tablet 3     Sig: TAKE 1 TABLET BY MOUTH EVERY DAY       Psychiatric Non-Scheduled (Anti-Anxiety) Passed - 3/6/2023 12:03 AM        Passed - In person appointment or virtual visit in the past 6 mos or appointment in next 3 mos     Recent Outpatient Visits              3 months ago Encounter for annual health examination    [address] Ric Hoffmann MD    Office Visit    5 months ago Elevated ALT measurement    Chan Esparza, [address] [facility]    Office Visit    10 months ago Type 2 diabetes mellitus without complication, without long-term current use of insulin [facility])    Mary Wing MD    Office Visit    1 year ago Routine physical examination    [address], [address], Mary Huang MD    Office Visit    1 year ago Type 2 diabetes mellitus without complication, without long-term current use of insulin [facility])    [address], [address], Mary Huang MD    Office Visit                           [email or web address]      [email or web address]

## 2023-05-26 ENCOUNTER — LAB ENCOUNTER (OUTPATIENT)
Dept: LAB | Age: 78
End: 2023-05-26
Attending: FAMILY MEDICINE
Payer: MEDICARE

## 2023-05-26 DIAGNOSIS — E11.9 TYPE 2 DIABETES MELLITUS WITHOUT COMPLICATION, WITHOUT LONG-TERM CURRENT USE OF INSULIN (HCC): ICD-10-CM

## 2023-05-26 DIAGNOSIS — E11.9 TYPE 2 DIABETES MELLITUS WITHOUT COMPLICATION, WITHOUT LONG-TERM CURRENT USE OF INSULIN (HCC): Primary | ICD-10-CM

## 2023-05-26 LAB
EST. AVERAGE GLUCOSE BLD GHB EST-MCNC: 146 MG/DL (ref 68–126)
HBA1C MFR BLD: 6.7 % (ref ?–5.7)

## 2023-05-26 PROCEDURE — 83036 HEMOGLOBIN GLYCOSYLATED A1C: CPT

## 2023-05-26 PROCEDURE — 36415 COLL VENOUS BLD VENIPUNCTURE: CPT

## 2023-05-30 ENCOUNTER — OFFICE VISIT (OUTPATIENT)
Dept: FAMILY MEDICINE CLINIC | Facility: CLINIC | Age: 78
End: 2023-05-30

## 2023-05-30 VITALS
SYSTOLIC BLOOD PRESSURE: 121 MMHG | DIASTOLIC BLOOD PRESSURE: 76 MMHG | HEART RATE: 81 BPM | TEMPERATURE: 97 F | BODY MASS INDEX: 25 KG/M2 | WEIGHT: 157 LBS

## 2023-05-30 DIAGNOSIS — L98.9 SKIN LESION OF FACE: ICD-10-CM

## 2023-05-30 DIAGNOSIS — E11.9 TYPE 2 DIABETES MELLITUS WITHOUT COMPLICATION, WITHOUT LONG-TERM CURRENT USE OF INSULIN (HCC): Primary | ICD-10-CM

## 2023-05-30 DIAGNOSIS — I10 ESSENTIAL HYPERTENSION: ICD-10-CM

## 2023-05-30 DIAGNOSIS — M85.80 OSTEOPENIA, UNSPECIFIED LOCATION: ICD-10-CM

## 2023-05-30 PROCEDURE — 99214 OFFICE O/P EST MOD 30 MIN: CPT | Performed by: FAMILY MEDICINE

## 2023-06-03 RX ORDER — LEVOTHYROXINE SODIUM 0.1 MG/1
100 TABLET ORAL DAILY
Qty: 90 TABLET | Refills: 3 | Status: SHIPPED | OUTPATIENT
Start: 2023-06-03

## 2023-06-03 NOTE — TELEPHONE ENCOUNTER
Refill passed per [facility] protocol. Requested Prescriptions   Pending Prescriptions Disp Refills    LEVOTHYROXINE 100 MCG Oral Tab [Pharmacy Med Name: LEVOTHYROXINE 100 MCG TABLET] 90 tablet 1     Sig: TAKE 1 TABLET BY MOUTH EVERY DAY       Thyroid Medication Protocol Passed - 6/3/2023 12:57 AM        Passed - TSH in past 12 months        Passed - Last TSH value is normal     Lab Results   Component Value Date    TSH 1.900 10/28/2022                 Passed - In person appointment or virtual visit in the past 12 mos or appointment in next 3 mos     Recent Outpatient Visits              4 days ago Type 2 diabetes mellitus without complication, without long-term current use of insulin [facility])    Kurtis Coleman, Rajesh Grady MD    Office Visit    6 months ago Encounter for annual health examination    [facility] [place] [facility], Rajesh Grady MD    Office Visit    8 months ago Elevated ALT measurement    [facility] [place] [facility] CLAUDINE Calix    Office Visit    1 year ago Type 2 diabetes mellitus without complication, without long-term current use of insulin [facility])    Edy Coleman MD    Office Visit    1 year ago Routine physical examination    Kurtis Coleman, Rajesh Grady MD    Office Visit          Future Appointments         Provider Department Appt Notes    In 1 month MD Aman Driver [address] been coughing for a long time . Dr Ciara Phillip suggested I see you.     In 5 months Nkechi Gutierrez MD [address] [facility] annual [place] wellness exam                  Recent Outpatient Visits              4 days ago Type 2 diabetes mellitus without complication, without long-term current use of insulin ([address]    [employer] Vito Posada MD    Office Visit    6 months ago Encounter for annual health examination    Mk Jones MD    Office Visit    8 months ago Elevated ALT measurement    [address], [facility] CLAUDINE Buchanan    Office Visit    1 year ago Type 2 diabetes mellitus without complication, without long-term current use of insulin [facility])    [address], Jonh Michelle MD    Office Visit    1 year ago Routine physical examination    [address], [address], Michel Maguire MD    Office Visit          Future Appointments         Provider Department Appt Notes    In 1 month Kristofer Reyes MD [address], [address] been coughing for a long time . Dr Aleksey Sung suggested I see you.     In 5 [facility] [place] [facility]

## 2023-07-13 RX ORDER — METFORMIN HYDROCHLORIDE 500 MG/1
TABLET, EXTENDED RELEASE ORAL
Qty: 90 TABLET | Refills: 3 | Status: SHIPPED | OUTPATIENT
Start: 2023-07-13

## 2023-07-13 NOTE — TELEPHONE ENCOUNTER
Refill passed per [employer], [facility] protocol. Requested Prescriptions   Pending Prescriptions Disp Refills    METFORMIN  MG Oral Tablet 24 Hr [Pharmacy Med Name: METFORMIN HCL  MG TABLET] 90 tablet 3     Sig: TAKE 1 TABLET BY MOUTH EVERY DAY WITH DINNER       Diabetes Medication Protocol Passed - 7/13/2023 12:54 AM        Passed - Last A1C < 7.5 and within past 6 months     Lab Results   Component Value Date    A1C 6.7 (H) 05/26/2023             Passed - In person appointment or virtual visit in the past 6 mos or appointment in next 3 mos     Recent Outpatient Visits              1 month ago Type 2 diabetes mellitus without complication, without long-term current use of insulin ([facility] [address]    [address], Teresa Liriano MD    Office Visit    8 months ago Encounter for annual health examination    [address], Teresa Liriaon MD    Office Visit    9 months ago Elevated ALT measurement    [address] [place] [address] CLAUDINE Benz    Office Visit    1 year ago Type 2 diabetes mellitus without complication, without long-term current use of insulin [facility])    [address], Teresa Liriano MD    Office Visit    1 year ago Routine physical examination    [address], Teresa Liriano MD    Office Visit          Future Appointments         Provider Department Appt Notes    In 6 days Master Zafar MD [address], [address] been coughing for a long time . Dr Beth Oseguera suggested I see you.     In 4 months Oscar Theodore MD [address] [place] [address] annual [place] wellness exam               Passed - EGFRCR or GFRNAA > 50     GFR Evaluation  EGFRCR: 88 , resulted on 10/28/2022          Passed - GFR in the past 12 months

## 2023-07-19 ENCOUNTER — HOSPITAL ENCOUNTER (OUTPATIENT)
Dept: GENERAL RADIOLOGY | Facility: HOSPITAL | Age: 78
Discharge: HOME | End: 2023-07-19
Attending: INTERNAL MEDICINE
Payer: MEDICARE

## 2023-07-19 ENCOUNTER — OFFICE VISIT (OUTPATIENT)
Dept: PULMONOLOGY | Facility: CLINIC | Age: 78
End: 2023-07-19

## 2023-07-19 VITALS
HEIGHT: 66 IN | DIASTOLIC BLOOD PRESSURE: 67 MMHG | WEIGHT: 155 LBS | RESPIRATION RATE: 20 BRPM | OXYGEN SATURATION: 97 % | HEART RATE: 87 BPM | SYSTOLIC BLOOD PRESSURE: 102 MMHG | BODY MASS INDEX: 24.91 KG/M2

## 2023-07-19 DIAGNOSIS — R05.3 CHRONIC COUGH: Primary | ICD-10-CM

## 2023-07-19 DIAGNOSIS — G47.33 OSA (OBSTRUCTIVE SLEEP APNEA): ICD-10-CM

## 2023-07-19 DIAGNOSIS — R05.3 CHRONIC COUGH: ICD-10-CM

## 2023-07-19 PROCEDURE — 99204 OFFICE O/P NEW MOD 45 MIN: CPT | Performed by: INTERNAL MEDICINE

## 2023-07-19 PROCEDURE — 71046 X-RAY EXAM CHEST 2 VIEWS: CPT | Performed by: INTERNAL MEDICINE

## 2023-07-19 PROCEDURE — 1126F AMNT PAIN NOTED NONE PRSNT: CPT | Performed by: INTERNAL MEDICINE

## 2023-07-19 NOTE — H&P
[facility]    Pulmonary consult     Yenni Sparks Patient Status:  Specimen    1945 MRN [id number]   Location [facility] Attending No att. providers found   Hosp Day # 0 PCP Elizabeth Oseguera MD     Date:  2023    History provided by:patient  HPI:   Patient presents with:  Consult: Pt is here to establish care and to discuss chronic occasional productive cough going on for a year.      HPI    This is a very pleasant 62-year-old female with history of GERD, Patton's esophagus, DM, HL, CVA, normal pressure hydrocephalus with a prior shunt    Patient reported a cough which is dry in nature started over a year ago worse with heartburn and GERD and sometimes while eating and at night  Started on Protonix which helped significantly but now again having some dry cough  No postnasal drip  No wheezes  No sputum or hemoptysis  Denied dyspnea or dyspnea upon exertion  No fever or chills  No weight loss or night sweats  Denies TB or exposure  Reported loud snoring by the  with nocturia and mild daytime sleepiness and fatigue    History     Past Medical History:   Diagnosis Date    Chronic right-sided low back pain without sciatica 2018    Colon polyps     colonic polyps    Depression     Detached retina     Dr Nguyễn Bay    Elevated LFTs     w/u Rogers Bench' disease     s/p WALKER Miko hypothyroid    Hypothyroidism     s/p WALKER   Dr Sweeney Founds    L3-4 right mod far lateral, L4-5 mod central & right foraminal, L5-S1 right mild-mod far lateral bulging discs 2018    L4-5 grade 2 unstable, L3-4 grade 1 unstable spondylolisthesis 2018    L4-5 mod central, L3-4 right mild lateral recess stenosis 2018    L5-S1 mild central HNP 2018    left S1 radiculopathy 2018    Microscopic hematuria 1999    Migraine     Other and unspecified hyperlipidemia     hypercholesterol     Past Surgical History:   Procedure Laterality Date    COLONOSCOPY & POLYPECTOMY  05/29/2008    REPLACE/REVISE BRAIN SHUNT  01/2018     Family History   Problem Relation Age of Onset    Heart Surgery Father         CABG 76s    Cancer Father         biliary     Social History:  Social History     Socioeconomic History    Marital status:    Tobacco Use    Smoking status: Never    Smokeless tobacco: Never   Vaping Use    Vaping Use: Never used   Substance and Sexual Activity    Alcohol use: Yes     Comment: on occasion     Drug use: No   Other Topics Concern    Caffeine Concern No    Exercise No   Social History Narrative    The patient does not use an assistive device. .      The patient does live in a home with stairs. Allergies/Medications: Allergies:   Cat Hair Extract        UNKNOWN, HIVES  (Not in a hospital admission)      Review of Systems:     Constitutional:  Positive for fatigue. Negative for fever and unexpected weight change. HENT: Negative. Respiratory:  Positive for cough. Negative for apnea, choking, chest tightness, shortness of breath, wheezing and stridor. Cardiovascular: Negative. Gastrointestinal:  Positive for heartburn. Negative for nausea, vomiting, abdominal pain and abdominal distention. Musculoskeletal: Negative. Skin: Negative. Neurological: Negative. Hematological: Negative. Psychiatric/Behavioral: Negative. Physical Exam:   Vital Signs:  Blood pressure 102/67, pulse 87, resp. rate 20, height 5' 6\" (1.676 m), weight 155 lb (70.3 kg), SpO2 97 %. Physical Exam  Constitutional:       General: She is not in acute distress. Appearance: Normal appearance. She is not ill-appearing. HENT:      Head: Normocephalic and atraumatic. Nose: Nose normal.      Mouth/Throat:      Mouth: Mucous membranes are moist.      Comments: Class III upper airway Mallampati score  Eyes:      Pupils: Pupils are equal, round, and reactive to light.    Cardiovascular:      Rate and Rhythm: Normal rate and regular rhythm. Heart sounds: No murmur heard. No gallop. Pulmonary:      Effort: Pulmonary effort is normal. No respiratory distress. Breath sounds: No stridor. No wheezing, rhonchi or rales. Abdominal:      General: Abdomen is flat. Bowel sounds are normal. There is no distension. Palpations: Abdomen is soft. Tenderness: There is no guarding or rebound. Musculoskeletal:      Cervical back: Normal range of motion. No rigidity. Right lower leg: No edema. Left lower leg: No edema. Skin:     General: Skin is dry. Neurological:      General: No focal deficit present. Mental Status: She is oriented to person, place, and time. Mental status is at baseline.    Psychiatric:         Behavior: Behavior normal.           Results:     Lab Results   Component Value Date    WBC 5.2 10/28/2022    HGB 15.0 10/28/2022    HCT 45.3 10/28/2022    .0 10/28/2022    CREATSERUM 0.71 10/28/2022    BUN 17 10/28/2022     10/28/2022    K 3.9 10/28/2022     10/28/2022    CO2 27.0 10/28/2022     (H) 10/28/2022    CA 10.1 10/28/2022    ALB 3.6 10/28/2022    ALKPHO 78 10/28/2022    BILT 0.7 10/28/2022    TP 6.5 10/28/2022    AST 18 10/28/2022    ALT 49 10/28/2022    PTT 28.2 01/02/2018    INR 1.0 01/02/2018    T4F 1.47 07/10/2018    TSH 1.900 10/28/2022    ESRML 5 07/10/2018    CRP 1.0 (H) 11/02/2017       Assessment/Plan:       1-chronic dry cough  Likely secondary to severe GERD  Better with PPI but not completely resolved    No history of smoking and denies history of asthma  No occupational exposure  No pets exposure  Normal lung exam  Not on any meds to cause cough  No postnasal drip  Clinically doubt airway disease, normal lung exam    Plan ;  Continue with PPI  Avoid eating or drinking 2 hours prior to bedtime  Aspiration precautions    Baseline chest x-ray and PFTs      2-suspected GARRET  Upper airway class III Mallampati score  Reported loud snoring    Plan ;  Home sleep study if significant GARRET we will treat with auto CPAP  Diet and exercise  Avoid driving at any time if sleepy      CXR , PFTs , Home sleep study   F/u in 3 months             Shankar Mathews MD  7/19/2023

## 2023-07-31 ENCOUNTER — TELEPHONE (OUTPATIENT)
Facility: CLINIC | Age: 78
End: 2023-07-31

## 2023-07-31 NOTE — TELEPHONE ENCOUNTER
Patient outreach message received:    1 year EGD recall placed in patient outreach. Next due 11/29/2023 Per Dr. Yocasta Jimenez. Recall reminder letter mailed out to patient.

## 2023-08-16 RX ORDER — HYDROCHLOROTHIAZIDE 12.5 MG/1
12.5 TABLET ORAL DAILY
Qty: 90 TABLET | Refills: 3 | Status: SHIPPED | OUTPATIENT
Start: 2023-08-16

## 2023-08-16 NOTE — TELEPHONE ENCOUNTER
Please review. Protocol failed / Has no protocol. [employer] message sent to patient to complete labs pended from [address] 5/30/23. Requested Prescriptions   Pending Prescriptions Disp Refills    HYDROCHLOROTHIAZIDE 12.5 MG Oral Tab [Pharmacy Med Name: HYDROCHLOROTHIAZIDE 12.5 MG TB] 90 tablet 1     Sig: TAKE 1 TABLET BY MOUTH EVERY DAY       Hypertensive Medications Protocol Failed - 8/16/2023 12:06 AM        Failed - CMP or BMP in past 6 months     No results found for this or any previous visit (from the past 4392 hour(s)).             Passed - In person appointment in the past 12 or next 3 months     Recent Outpatient Visits              4 weeks ago Chronic cough    [facility], [facility] [place], Aram Ray MD    Office Visit    2 months ago Type 2 diabetes mellitus without complication, without long-term current use of insulin [facility])    [address], [facility] Pancho Duran MD    Office Visit    9 months ago Encounter for annual health examination    [facility], [facility] Pancho Duran MD    Office Visit    10 months ago Elevated ALT measurement    [address] [facility], [address] , APRN    Office Visit    1 year ago Type 2 diabetes mellitus without complication, without long-term current use of insulin [facility])    [address], [facility] Pancho Duran MD    Office Visit          Future Appointments         Provider Department Appt Notes    In 3 months Pancho Duran MD [address] [place] [address], [facility] annual [employer] wellness exam               Passed - Last BP reading less than 140/90     BP Readings from Last 1 Encounters:  07/19/23 : 102/67              Passed - In person appointment or virtual visit in the past 6 months     Recent Outpatient Visits              4 weeks ago Chronic cough    [facility], [place] [address] [facility]    Office Visit    2 months ago Type 2 diabetes mellitus without complication, without long-term current use of insulin [facility])    [address], [address], [address] [facility] [address], MD    Office Visit    9 months ago Encounter for annual health examination    [facility] Calla Rubinstein, MD    Office Visit    10 months ago Elevated ALT measurement    [facility], [address] [facility], APRN    Office Visit    1 year ago Type 2 diabetes mellitus without complication, without long-term current use of insulin [facility])    [address], [address], [facility] Calla Rubinstein, MD    Office Visit          Future Appointments         Provider Department Appt Notes    In 3 months Calla Rubinstein, MD [facility], [facility] annual [place] wellness exam               Passed - [facility] or GFRNAA > 50     GFR Evaluation  EGFRCR: 88 , resulted on 10/28/2022             Future Appointments         Provider Department Appt Notes    In 3 months Calla Rubinstein, MD [address], [address], [facility] annual [place] wellness exam           Recent Outpatient Visits              4 weeks ago Chronic cough    [facility], [facility], Celestina Khan MD    Office Visit    2 months ago Type 2 diabetes mellitus without complication, without long-term current use of insulin [facility])    [address], [address], [facility] Calla Rubinstein, MD    Office Visit    9 months ago Encounter for annual health examination    [place] Flax, [address] [facility] [address], MD    Office Visit    10 months ago Elevated ALT measurement    [place] Flax, [facility], APRN    Office Visit    1 year ago Type 2 diabetes mellitus without complication, without long-term current use of insulin [facility])    [address], [address], Yeimi Miles MD    Office Visit

## 2023-11-03 ENCOUNTER — TELEPHONE (OUTPATIENT)
Facility: CLINIC | Age: 78
End: 2023-11-03

## 2023-11-03 DIAGNOSIS — K20.90 BARRETT'S ESOPHAGUS WITH ESOPHAGITIS: Primary | ICD-10-CM

## 2023-11-03 DIAGNOSIS — K22.70 BARRETT'S ESOPHAGUS WITH ESOPHAGITIS: Primary | ICD-10-CM

## 2023-11-03 NOTE — TELEPHONE ENCOUNTER
Dr. Mina Abraham,   Please review EGD recall and give orders when able. Thanks,  Xavier Gomez    Last Procedure, Date, MD:  EGD/colon 22, Dr. Mnia Abraham  Last Diagnosis:  Barretts  Recalled (mth/yrs): 1 year  Sedation Used Previously:  MAC  Anticoagulants: ASA 81 mg  Diuretics: HCTZ  Diabetic Med's (PO/Injectables): metformin  Weight loss Med's: No  Iron/Herbal/Multivitamin Supplements (RX/OTC):Vit D  Marijuana/Vaping/CBD: No  Height & Weight: 5'6\"/150lbs  BMI: 24.2  Hx of Cardiac/CVA Issues/(MI/Stroke): TIA 2019  Devices Pacemaker/Defibrillator/Stents: No  Respiratory Issues/Oxygen Use/GARRET/COPD: No  Issues w/ Anesthesia: No    Symptoms (Y/N): No  Symptoms Details: No    Special Comments/Notes:  shunt from [place] (years ago)    Please advise on orders and prep. Thank you! SOPHAGOGASTRODUODENOSCOPY (EGD) & COLONOSCOPY REPORT           Yenni Sparks      1945 Age 69year old   PCP Elizabeth Oseguera MD Endoscopist Cayla Lake MD      Date of procedure: 22     Procedure: EGD w/biopsies & Colonoscopy      Pre-operative diagnosis: chronic cough and screening colonoscopy     Post-operative diagnosis: hiatal hernia, gastric erythema, r/o barretts, normal colonoscopy with hemorrhoids     Medications: MAC sedation     Withdrawal time: 10 minutes     Complications: none     Procedure: Informed consent was obtained from the patient after the risks of the procedure were discussed, including but not limited to bleeding, perforation, aspiration, infection, or possibility of a missed lesion. We discussed the risks/benefits and alternatives to this procedure, as well as the planned sedation. EGD procedure: The patient was placed in the left lateral decubitus position and begun on continuous blood pressure pulse oximetry and EKG monitoring and this was maintained throughout the procedure. Once an adequate level of sedation was obtained a bite block was placed.  Then the lubricated tip of the [id number] diagnostic video upper endoscope was inserted and advanced using direct visualization into the posterior pharynx and ultimately into the esophagus. Colonoscopy procedure: Once an adequate level of sedation was obtained a digital rectal exam was completed. Then the lubricated tip of the [id number]-[id number] diagnostic video colonoscope was inserted and advanced without difficulty to the cecum using the CO2 insufflation technique. The cecum was identified by localizing the trifold, the appendix and the ileocecal valve. A routine second examination of the cecum/ascending colon was performed. Withdrawal was begun with thorough washing and careful examination of the colonic walls and folds. Photodocumentation was obtained. The bowel prep was good. Views of the colon were good with washing. I then carefully withdrew the instrument from the patient who tolerated the procedure well. Complications: None     EGD findings:       1. Esophagus: The squamocolumnar junction was noted at 35 cm and appeared irregular with one column. 1 cm, of salmon mucosa biopsied to ruleout valladares's. The GE junction was noted at 35 cm from the incisors. Hiatus was at 40 cm. The esophageal mucosa appeared otherwise normal. There was no evidence of esophagitis or stricture   2. Stomach: The stomach distended normally. Normal rugal folds were seen. The pylorus was patent. The gastric mucosa appeared erythematous so biopsied for HPylori. Retroflexion revealed a normal fundus and a normal cardia. 3. Duodenum: The duodenal mucosa appeared normal in the 1st and 2nd portion of the duodenum. Colonoscopy findings:     1. ISIDRO: normal rectal tone, no masses palpated. 2. NO polyp(s) noted   3. Terminal ileum: the visualized mucosa appeared normal.  4. The colonic mucosa throughout the colon showed normal vascular pattern, without evidence of angioectasias or inflammation. 5. Diverticulosis: none noted.   6. A retroflexed view of the rectum revealed hemorrhoids. Impression:  Hiatal hernia and acid reflux     Recommend:  Repeat colonoscopy in 10 years. If new signs or symptoms develop, colonoscopy may need to be repeated sooner. High fiber diet. Monitor for blood in the stool. If having more than just tinge of blood, call office or go to the ER. Here are some reflux precautions:   - Avoid trigger foods  - Anti-reflux measures: raising the head of the bed at night, avoiding tight clothing or belts, avoiding eating late at night and not lying down shortly after mealtime and achieving weight loss   - Avoid NSAID's, caffeine, peppermints, alcohol, tobacco and foods that incite symptoms   Continue PPI daily, await biopsies    Final Diagnosis:      A. Gastric biopsy:  Fragments of gastric mucosa with focal mild inactive gastritis. Diff Quik stain (with appropriate control reactivity) is negative for H pylori microorganisms. B. Distal esophagus; biopsy:  Multiple fragments of squamous and glandular type mucosa with mild chronic inflammation and multifocal goblet cell intestinal metaplasia, consistent with the clinical diagnosis of Barrrett's esophagus. No evidence of dysplasia or malignancy is identified. Diff-Quik stain (with appropriate control reactivity) is negative for Helicobacter pylori microorganisms.

## 2023-11-08 ENCOUNTER — LAB ENCOUNTER (OUTPATIENT)
Dept: LAB | Age: 78
End: 2023-11-08
Attending: FAMILY MEDICINE
Payer: MEDICARE

## 2023-11-08 DIAGNOSIS — E11.9 TYPE 2 DIABETES MELLITUS WITHOUT COMPLICATION, WITHOUT LONG-TERM CURRENT USE OF INSULIN (HCC): ICD-10-CM

## 2023-11-08 LAB
ALBUMIN SERPL-MCNC: 3.9 G/DL (ref 3.2–4.8)
ALBUMIN/GLOB SERPL: 1.7 {RATIO} (ref 1–2)
ALP LIVER SERPL-CCNC: 67 U/L
ALT SERPL-CCNC: 65 U/L
ANION GAP SERPL CALC-SCNC: 4 MMOL/L (ref 0–18)
AST SERPL-CCNC: 29 U/L (ref ?–34)
BILIRUB SERPL-MCNC: 0.9 MG/DL (ref 0.2–1.1)
BUN BLD-MCNC: 14 MG/DL (ref 9–23)
BUN/CREAT SERPL: 21.2 (ref 10–20)
CALCIUM BLD-MCNC: 10.1 MG/DL (ref 8.7–10.4)
CHLORIDE SERPL-SCNC: 112 MMOL/L (ref 98–112)
CHOLEST SERPL-MCNC: 151 MG/DL (ref ?–200)
CO2 SERPL-SCNC: 29 MMOL/L (ref 21–32)
CREAT BLD-MCNC: 0.66 MG/DL
CREAT UR-SCNC: 108.6 MG/DL
DEPRECATED RDW RBC AUTO: 45.5 FL (ref 35.1–46.3)
EGFRCR SERPLBLD CKD-EPI 2021: 90 ML/MIN/1.73M2 (ref 60–?)
ERYTHROCYTE [DISTWIDTH] IN BLOOD BY AUTOMATED COUNT: 13.2 % (ref 11–15)
EST. AVERAGE GLUCOSE BLD GHB EST-MCNC: 146 MG/DL (ref 68–126)
FASTING PATIENT LIPID ANSWER: YES
FASTING STATUS PATIENT QL REPORTED: YES
GLOBULIN PLAS-MCNC: 2.3 G/DL (ref 2.8–4.4)
GLUCOSE BLD-MCNC: 142 MG/DL (ref 70–99)
HBA1C MFR BLD: 6.7 % (ref ?–5.7)
HCT VFR BLD AUTO: 45.2 %
HDLC SERPL-MCNC: 53 MG/DL (ref 40–59)
HGB BLD-MCNC: 14.9 G/DL
LDLC SERPL CALC-MCNC: 82 MG/DL (ref ?–100)
MCH RBC QN AUTO: 31 PG (ref 26–34)
MCHC RBC AUTO-ENTMCNC: 33 G/DL (ref 31–37)
MCV RBC AUTO: 94 FL
MICROALBUMIN UR-MCNC: 1 MG/DL
MICROALBUMIN/CREAT 24H UR-RTO: 9.2 UG/MG (ref ?–30)
NONHDLC SERPL-MCNC: 98 MG/DL (ref ?–130)
OSMOLALITY SERPL CALC.SUM OF ELEC: 303 MOSM/KG (ref 275–295)
PLATELET # BLD AUTO: 262 10(3)UL (ref 150–450)
POTASSIUM SERPL-SCNC: 3.7 MMOL/L (ref 3.5–5.1)
PROT SERPL-MCNC: 6.2 G/DL (ref 5.7–8.2)
RBC # BLD AUTO: 4.81 X10(6)UL
SODIUM SERPL-SCNC: 145 MMOL/L (ref 136–145)
TRIGL SERPL-MCNC: 84 MG/DL (ref 30–149)
TSI SER-ACNC: 0.36 MIU/ML (ref 0.55–4.78)
VLDLC SERPL CALC-MCNC: 13 MG/DL (ref 0–30)
WBC # BLD AUTO: 5.8 X10(3) UL (ref 4–11)

## 2023-11-08 PROCEDURE — 85027 COMPLETE CBC AUTOMATED: CPT

## 2023-11-08 PROCEDURE — 80053 COMPREHEN METABOLIC PANEL: CPT

## 2023-11-08 PROCEDURE — 82570 ASSAY OF URINE CREATININE: CPT

## 2023-11-08 PROCEDURE — 84443 ASSAY THYROID STIM HORMONE: CPT

## 2023-11-08 PROCEDURE — 82043 UR ALBUMIN QUANTITATIVE: CPT

## 2023-11-08 PROCEDURE — 80061 LIPID PANEL: CPT

## 2023-11-08 PROCEDURE — 36415 COLL VENOUS BLD VENIPUNCTURE: CPT

## 2023-11-08 PROCEDURE — 83036 HEMOGLOBIN GLYCOSYLATED A1C: CPT

## 2023-11-14 ENCOUNTER — OFFICE VISIT (OUTPATIENT)
Dept: FAMILY MEDICINE CLINIC | Facility: CLINIC | Age: 78
End: 2023-11-14
Payer: MEDICARE

## 2023-11-14 VITALS
SYSTOLIC BLOOD PRESSURE: 116 MMHG | HEART RATE: 74 BPM | HEIGHT: 65 IN | WEIGHT: 154.38 LBS | OXYGEN SATURATION: 94 % | BODY MASS INDEX: 25.72 KG/M2 | DIASTOLIC BLOOD PRESSURE: 76 MMHG | TEMPERATURE: 97 F

## 2023-11-14 DIAGNOSIS — E78.5 HYPERLIPIDEMIA, UNSPECIFIED HYPERLIPIDEMIA TYPE: ICD-10-CM

## 2023-11-14 DIAGNOSIS — Z12.31 BREAST CANCER SCREENING BY MAMMOGRAM: ICD-10-CM

## 2023-11-14 DIAGNOSIS — M85.80 OSTEOPENIA, UNSPECIFIED LOCATION: ICD-10-CM

## 2023-11-14 DIAGNOSIS — M17.11 PRIMARY OSTEOARTHRITIS OF RIGHT KNEE: ICD-10-CM

## 2023-11-14 DIAGNOSIS — Z00.00 ENCOUNTER FOR ANNUAL HEALTH EXAMINATION: ICD-10-CM

## 2023-11-14 DIAGNOSIS — K22.70 BARRETT'S ESOPHAGUS WITH ESOPHAGITIS: ICD-10-CM

## 2023-11-14 DIAGNOSIS — Z86.73 HISTORY OF CVA (CEREBROVASCULAR ACCIDENT): ICD-10-CM

## 2023-11-14 DIAGNOSIS — K63.5 POLYP OF COLON, UNSPECIFIED PART OF COLON, UNSPECIFIED TYPE: ICD-10-CM

## 2023-11-14 DIAGNOSIS — H81.10 BENIGN PAROXYSMAL POSITIONAL VERTIGO, UNSPECIFIED LATERALITY: ICD-10-CM

## 2023-11-14 DIAGNOSIS — Z00.00 ENCOUNTER FOR ANNUAL PHYSICAL EXAM: Primary | ICD-10-CM

## 2023-11-14 DIAGNOSIS — K20.90 BARRETT'S ESOPHAGUS WITH ESOPHAGITIS: ICD-10-CM

## 2023-11-14 DIAGNOSIS — E11.9 TYPE 2 DIABETES MELLITUS WITHOUT COMPLICATION, WITHOUT LONG-TERM CURRENT USE OF INSULIN (HCC): ICD-10-CM

## 2023-11-14 DIAGNOSIS — E89.0 POSTABLATIVE HYPOTHYROIDISM: ICD-10-CM

## 2023-11-14 DIAGNOSIS — F33.41 RECURRENT MAJOR DEPRESSIVE DISORDER, IN PARTIAL REMISSION (HCC): ICD-10-CM

## 2023-11-14 DIAGNOSIS — G91.2 NORMAL PRESSURE HYDROCEPHALUS (HCC): ICD-10-CM

## 2023-11-14 RX ORDER — LEVOTHYROXINE SODIUM 88 UG/1
88 TABLET ORAL
Qty: 90 TABLET | Refills: 0 | Status: SHIPPED | OUTPATIENT
Start: 2023-11-14

## 2023-11-19 NOTE — TELEPHONE ENCOUNTER
Schedule EGD with MAC [Diagnosis: barretts]    Continue all medications for procedure except  -Diabetes meds: Hold metformin and/or other oral diabetic medications day of procedure and day before procedure.  If patient is on other diabetic medications/insulin please ask primary or endocrine to manage pre-procedure and day of procedure    ** If MAC @ [facility]:    - NO alcohol, recreational drugs nor erectile dysfunction mediations 72 hours before procedure(s)   - NO herbal supplements or weight loss medications x 7 days prior to the procedure(s)    ** If MAC @ [facility] or IV twilight - continue all medications as prescribed

## 2023-11-22 NOTE — TELEPHONE ENCOUNTER
TCB    If patient calls back, can transfer to [facility] [phone] today only (11/22/2023 )

## 2023-11-30 NOTE — TELEPHONE ENCOUNTER
Dr. Jm Win-   One year recall was for November of this year. Ok to schedule patient in next opening at [address] [place] [address] in end of March early April? Or we can add to end of day @ 4 on 12/11 or 12/18 per endo. Patient needs to be at [address] [place] [address] due to stroke history. Please advise. Thank you!

## 2023-12-05 NOTE — TELEPHONE ENCOUNTER
PPD-  Patient is scheduled for EGD 40283 on 12/18/23 with Dr. Yocasta Jimenez at [address] [place] [address]. Thank you!

## 2023-12-05 NOTE — TELEPHONE ENCOUNTER
Scheduled for:  EGD [id number]   Provider Name:  Dr. Aura Nguyen   Date:  12/18/2023  Location:    [facility]  Sedation:  mac  Time:  4:00 (patient is aware arrival time is 3:00)  Prep:  NPO after midnight   Meds/Allergies Reconciled?:  Physician reviewed   Diagnosis with codes:  Patton's esophagus K22.70  Was patient informed to call insurance with codes (Y/N):  Yes, I confirmed Medicare insurance with the patient. Referral sent?:  Referral was sent at the time of electronic surgical scheduling. [facility] or [phone] notified?:  I sent an electronic request to Endo Scheduling and received a confirmation today. Medication Orders: This patient verbally confirmed that she is not taking:   Iron, blood thinners, BP meds, and is not diabetic   Not latex allergy, Not PCN allergy and does not have a pacemaker  Misc Orders:     I discussed the prep instructions with the patient which she verbally understood and is aware that I will mychart the instructions today.     Further instructions given by staff:

## 2023-12-06 NOTE — TELEPHONE ENCOUNTER
RN called and spoke with pt, informed her that written instructions were sent to her [employer] for EGD (and not read yet). Informed pt that she does NOT need to hold metformin day before procedure. Pt states she takes metformin once daily with dinner, informed she can take it day before procedure and with dinner after procedure. [employer] message sent. Pt verbalized understanding.

## 2023-12-18 ENCOUNTER — HOSPITAL ENCOUNTER (OUTPATIENT)
Facility: HOSPITAL | Age: 78
Setting detail: HOSPITAL OUTPATIENT SURGERY
Discharge: HOME | End: 2023-12-18
Attending: INTERNAL MEDICINE | Admitting: INTERNAL MEDICINE
Payer: MEDICARE

## 2023-12-18 ENCOUNTER — ANESTHESIA (OUTPATIENT)
Dept: ENDOSCOPY | Facility: HOSPITAL | Age: 78
End: 2023-12-18
Payer: MEDICARE

## 2023-12-18 ENCOUNTER — ANESTHESIA EVENT (OUTPATIENT)
Dept: ENDOSCOPY | Facility: HOSPITAL | Age: 78
End: 2023-12-18
Payer: MEDICARE

## 2023-12-18 VITALS
SYSTOLIC BLOOD PRESSURE: 117 MMHG | OXYGEN SATURATION: 94 % | TEMPERATURE: 98 F | RESPIRATION RATE: 19 BRPM | BODY MASS INDEX: 25.66 KG/M2 | DIASTOLIC BLOOD PRESSURE: 69 MMHG | WEIGHT: 154 LBS | HEIGHT: 65 IN | HEART RATE: 86 BPM

## 2023-12-18 DIAGNOSIS — K20.90 BARRETT'S ESOPHAGUS WITH ESOPHAGITIS: ICD-10-CM

## 2023-12-18 DIAGNOSIS — K22.70 BARRETT'S ESOPHAGUS WITH ESOPHAGITIS: ICD-10-CM

## 2023-12-18 LAB — GLUCOSE BLDC GLUCOMTR-MCNC: 158 MG/DL (ref 70–99)

## 2023-12-18 PROCEDURE — 0DB98ZX EXCISION OF DUODENUM, VIA NATURAL OR ARTIFICIAL OPENING ENDOSCOPIC, DIAGNOSTIC: ICD-10-PCS | Performed by: INTERNAL MEDICINE

## 2023-12-18 PROCEDURE — 43239 EGD BIOPSY SINGLE/MULTIPLE: CPT | Performed by: INTERNAL MEDICINE

## 2023-12-18 PROCEDURE — 0DB38ZX EXCISION OF LOWER ESOPHAGUS, VIA NATURAL OR ARTIFICIAL OPENING ENDOSCOPIC, DIAGNOSTIC: ICD-10-PCS | Performed by: INTERNAL MEDICINE

## 2023-12-18 PROCEDURE — 0DB78ZX EXCISION OF STOMACH, PYLORUS, VIA NATURAL OR ARTIFICIAL OPENING ENDOSCOPIC, DIAGNOSTIC: ICD-10-PCS | Performed by: INTERNAL MEDICINE

## 2023-12-18 RX ORDER — NALOXONE HYDROCHLORIDE 0.4 MG/ML
80 INJECTION, SOLUTION INTRAMUSCULAR; INTRAVENOUS; SUBCUTANEOUS AS NEEDED
Status: DISCONTINUED | OUTPATIENT
Start: 2023-12-18 | End: 2023-12-18

## 2023-12-18 RX ORDER — NICOTINE POLACRILEX 4 MG
15 LOZENGE BUCCAL
Status: DISCONTINUED | OUTPATIENT
Start: 2023-12-18 | End: 2023-12-18

## 2023-12-18 RX ORDER — NICOTINE POLACRILEX 4 MG
30 LOZENGE BUCCAL
Status: DISCONTINUED | OUTPATIENT
Start: 2023-12-18 | End: 2023-12-18

## 2023-12-18 RX ORDER — HYDROMORPHONE HYDROCHLORIDE 1 MG/ML
0.4 INJECTION, SOLUTION INTRAMUSCULAR; INTRAVENOUS; SUBCUTANEOUS EVERY 5 MIN PRN
Status: DISCONTINUED | OUTPATIENT
Start: 2023-12-18 | End: 2023-12-18

## 2023-12-18 RX ORDER — MORPHINE SULFATE 4 MG/ML
4 INJECTION, SOLUTION INTRAMUSCULAR; INTRAVENOUS EVERY 10 MIN PRN
Status: DISCONTINUED | OUTPATIENT
Start: 2023-12-18 | End: 2023-12-18

## 2023-12-18 RX ORDER — MORPHINE SULFATE 10 MG/ML
6 INJECTION, SOLUTION INTRAMUSCULAR; INTRAVENOUS EVERY 10 MIN PRN
Status: DISCONTINUED | OUTPATIENT
Start: 2023-12-18 | End: 2023-12-18

## 2023-12-18 RX ORDER — MORPHINE SULFATE 4 MG/ML
2 INJECTION, SOLUTION INTRAMUSCULAR; INTRAVENOUS EVERY 10 MIN PRN
Status: DISCONTINUED | OUTPATIENT
Start: 2023-12-18 | End: 2023-12-18

## 2023-12-18 RX ORDER — HYDROMORPHONE HYDROCHLORIDE 1 MG/ML
0.6 INJECTION, SOLUTION INTRAMUSCULAR; INTRAVENOUS; SUBCUTANEOUS EVERY 5 MIN PRN
Status: DISCONTINUED | OUTPATIENT
Start: 2023-12-18 | End: 2023-12-18

## 2023-12-18 RX ORDER — SODIUM CHLORIDE, SODIUM LACTATE, POTASSIUM CHLORIDE, CALCIUM CHLORIDE 600; 310; 30; 20 MG/100ML; MG/100ML; MG/100ML; MG/100ML
INJECTION, SOLUTION INTRAVENOUS CONTINUOUS
Status: DISCONTINUED | OUTPATIENT
Start: 2023-12-18 | End: 2023-12-18

## 2023-12-18 RX ORDER — HYDROMORPHONE HYDROCHLORIDE 1 MG/ML
0.2 INJECTION, SOLUTION INTRAMUSCULAR; INTRAVENOUS; SUBCUTANEOUS EVERY 5 MIN PRN
Status: DISCONTINUED | OUTPATIENT
Start: 2023-12-18 | End: 2023-12-18

## 2023-12-18 RX ORDER — DEXTROSE MONOHYDRATE 25 G/50ML
50 INJECTION, SOLUTION INTRAVENOUS
Status: DISCONTINUED | OUTPATIENT
Start: 2023-12-18 | End: 2023-12-18

## 2023-12-18 RX ADMIN — SODIUM CHLORIDE, SODIUM LACTATE, POTASSIUM CHLORIDE, CALCIUM CHLORIDE: 600; 310; 30; 20 INJECTION, SOLUTION INTRAVENOUS at 10:53:00

## 2023-12-18 NOTE — DISCHARGE INSTRUCTIONS
Home Care Instructions for Gastroscopy with Sedation    Diet:  - Resume your regular diet as tolerated unless otherwise instructed. - Start with light meals to minimize bloating.  - Do not drink alcohol today. Medication:  - If you have questions about resuming your normal medications, please contact your Primary Care Physician. Activities:  - Take it easy today. Do not return to work today. - Do not drive today. - Do not operate any machinery today (including kitchen equipment). Colonoscopy:  - You may notice some rectal \"spotting\" (a little blood on the toilet tissue) for a day or two after the exam. This is normal.  - If you experience any rectal bleeding (not spotting), persistent tenderness or sharp severe abdominal pains, oral temperature over 100 degrees Fahrenheit, light-headedness or dizziness, or any other problems, contact your doctor. Gastroscopy:  - You may have a sore throat for 2-3 days following the exam. This is normal. Gargling with warm salt water (1/2 tsp salt to 1 glass warm water) or using throat lozenges will help. - If you experience any sharp pain in your neck, abdomen or chest, vomiting of blood, oral temperature over 100 degrees Fahrenheit, light-headedness or dizziness, or any other problems, contact your doctor. **If unable to reach your doctor, please go to the [facility] Emergency Room**    - Your referring physician will receive a full report of your examination.  - If you do not hear from your doctor's office within two weeks of your biopsy, please call them for your results. You may be able to see your laboratory results in [facility] between 4 and 7 business days. In some cases, your physician may not have viewed the results before they are released to [facility]. If you have questions regarding your results contact the physician who ordered the test/exam by phone or via [facility] by choosing \"Ask a Medical Question. \"

## 2023-12-18 NOTE — OPERATIVE REPORT
ESOPHAGOGASTRODUODENOSCOPY (EGD) REPORT    Vickie Phillips     1945 Age 69year old   PCP Elizabeth Richardson MD Endoscopist Ivett Watkins MD       Date of procedure: 23    Procedure: EGD w/biopsies and cold biopsy polypectomy    Pre-operative diagnosis: barretts follow up    Post-operative diagnosis: gastric polyp, hiatal hernia, wide open esophageal ring and valladares's     Medications: MAC sedation    Complications: none    Procedure:  Informed consent was obtained from the patient after the risks of the procedure were discussed, including but not limited to bleeding, perforation, aspiration, infection, or possibility of a missed lesion. After discussions of the risks/benefits and alternatives to this procedure, as well as the planned sedation, the patient was placed in the left lateral decubitus position and begun on continuous blood pressure pulse oximetry and EKG monitoring and this was maintained throughout the procedure. Once an adequate level of sedation was obtained a bite block was placed. Then the lubricated tip of the [id number] diagnostic video upper endoscope was inserted and advanced using direct visualization into the posterior pharynx and ultimately into the esophagus. Complications: None    Findings:      1. Esophagus: The squamocolumnar junction was noted at 35 cm and appeared irregular with one column. 1 cm, of salmon mucosa biopsied to ruleout valladares's. The GE junction was noted at 35 cm from the incisors. Hiatus was at 40 cm. The esophageal mucosa appeared otherwise normal. There was no evidence of esophagitis or stricture     2. Stomach: The stomach distended normally. Normal rugal folds were seen. The pylorus was patent. The gastric mucosa appeared erythematous so biopsied for HPylori. 3 mm polyp in the stomach removed with cold biopsy polypectomy and retrieved. Retroflexion revealed a normal fundus and a normal cardia.      3. Duodenum: The duodenal mucosa appeared normal in the 1st and 2nd portion of the duodenum. Recommend:  1. Here are some reflux precautions:   - Avoid trigger foods  - Anti-reflux measures: raising the head of the bed at night, avoiding tight clothing or belts, avoiding eating late at night and not lying down shortly after mealtime and achieving weight loss   - Avoid NSAID's, caffeine, peppermints, alcohol, tobacco and foods that incite symptoms   2. Await biopsies  3. Continue PPI daily before breakfast    >>>If tissue was sampled/removed and you have not received your pathology results either by phone or letter within 2 weeks, please call our office at [phone].     Specimens:  Gastric polyp, gastric erythema, distal esophageal biopsies

## 2023-12-21 ENCOUNTER — TELEPHONE (OUTPATIENT)
Facility: CLINIC | Age: 78
End: 2023-12-21

## 2023-12-21 NOTE — TELEPHONE ENCOUNTER
Message sent to patient outreach to schedule follow up appt in 2 years    Pt reviewed plan of care from Arik Melissa via mychart  Seen by patient Ashley Chaudhari on 12/21/2023  3:30 PM

## 2023-12-21 NOTE — TELEPHONE ENCOUNTER
----- Message from Edna Melissa MD sent at 12/21/2023  2:19 PM CST -----  History of valladares's and biopsy positive for valladares's  Given age and 1cm of valladares's follow up in office in 2 years and discuss repeat EGD in 3 years  Here are some reflux precautions:   - Avoid trigger foods  - Anti-reflux measures: raising the head of the bed at night, avoiding tight clothing or belts, avoiding eating late at night and not lying down shortly after mealtime and achieving weight loss   - Avoid NSAID's, caffeine, peppermints, alcohol, tobacco and foods that incite symptoms     Continue pantoprazole 30 minutes before breakfast.

## 2024-01-04 ENCOUNTER — LAB ENCOUNTER (OUTPATIENT)
Dept: LAB | Age: 79
End: 2024-01-04
Attending: FAMILY MEDICINE
Payer: MEDICARE

## 2024-01-04 DIAGNOSIS — E89.0 POSTABLATIVE HYPOTHYROIDISM: ICD-10-CM

## 2024-01-04 LAB — TSI SER-ACNC: 0.46 MIU/ML (ref 0.55–4.78)

## 2024-01-04 PROCEDURE — 36415 COLL VENOUS BLD VENIPUNCTURE: CPT

## 2024-01-04 PROCEDURE — 84443 ASSAY THYROID STIM HORMONE: CPT

## 2024-01-05 DIAGNOSIS — E89.0 POSTABLATIVE HYPOTHYROIDISM: Primary | ICD-10-CM

## 2024-01-05 RX ORDER — LEVOTHYROXINE SODIUM 0.07 MG/1
75 TABLET ORAL
Qty: 90 TABLET | Refills: 0 | Status: SHIPPED | OUTPATIENT
Start: 2024-01-05

## 2024-02-28 RX ORDER — ESCITALOPRAM OXALATE 20 MG/1
20 TABLET ORAL DAILY
Qty: 90 TABLET | Refills: 3 | Status: SHIPPED | OUTPATIENT
Start: 2024-02-28

## 2024-02-28 NOTE — TELEPHONE ENCOUNTER
Refill passed per protocol.    Please review pended refill request as unable to refill due to high/very high drug interaction warning copied here:    High  High Dose: escitalopram, 20 mg, Oral, DailySingle dose of 20 mg exceeds recommended maximum of 10 mg by 100%  Daily dose of 20 mg exceeds recommended maximum of 10 mg by 100%      Requested Prescriptions   Pending Prescriptions Disp Refills    ESCITALOPRAM 20 MG Oral Tab [Pharmacy Med Name: ESCITALOPRAM 20 MG TABLET] 90 tablet 3     Sig: TAKE 1 TABLET BY MOUTH EVERY DAY       Psychiatric Non-Scheduled (Anti-Anxiety) Passed - 2/28/2024 12:02 AM        Passed - In person appointment or virtual visit in the past 6 mos or appointment in next 3 mos     Recent Outpatient Visits              3 months ago Encounter for annual physical exam    [facility] Elizabeth Thomas MD    Office Visit    7 months ago Chronic cough    [facility]    Office Visit    9 months ago Type 2 diabetes mellitus without complication, without long-term current use of insulin ([facility])    [facility] Elizabeht Thomas MD    Office Visit    1 year ago Encounter for annual health examination    [facility] Elizabeth Thomas MD    Office Visit    1 year ago Elevated ALT measurement    [facility] Courtney Lomax APRN    Office Visit          Future Appointments         Provider Department Appt Notes    In 3 weeks [address]                Passed - Depression Screening completed within the past 12 months             Future Appointments         Provider Department Appt Notes    In 3 weeks [address]           Recent Outpatient Visits              3 months ago Encounter for annual physical exam     [facility]    [facility] Visit    7 months ago Chronic cough    [facility], [facility], Shankar Camarillo MD    Office Visit    9 months ago Type 2 diabetes mellitus without complication, without long-term current use of insulin (HCC)    [facility] [place]    Office Visit    1 year ago Encounter for annual health examination    [facility], [facility] [place]    Office Visit    1 year ago Elevated ALT measurement    [facility] Courtney Lomax APRN    Office Visit

## 2024-03-01 ENCOUNTER — LAB ENCOUNTER (OUTPATIENT)
Dept: LAB | Age: 79
End: 2024-03-01
Attending: FAMILY MEDICINE
Payer: MEDICARE

## 2024-03-01 DIAGNOSIS — E89.0 POSTABLATIVE HYPOTHYROIDISM: ICD-10-CM

## 2024-03-01 LAB — TSI SER-ACNC: 7.77 MIU/ML (ref 0.55–4.78)

## 2024-03-01 PROCEDURE — 84443 ASSAY THYROID STIM HORMONE: CPT

## 2024-03-01 PROCEDURE — 36415 COLL VENOUS BLD VENIPUNCTURE: CPT

## 2024-03-03 DIAGNOSIS — E89.0 POSTABLATIVE HYPOTHYROIDISM: Primary | ICD-10-CM

## 2024-03-03 RX ORDER — LEVOTHYROXINE SODIUM 88 UG/1
88 TABLET ORAL
Qty: 90 TABLET | Refills: 0 | Status: SHIPPED | OUTPATIENT
Start: 2024-03-03

## 2024-04-24 ENCOUNTER — LAB ENCOUNTER (OUTPATIENT)
Dept: LAB | Age: 79
End: 2024-04-24
Attending: FAMILY MEDICINE
Payer: MEDICARE

## 2024-04-24 ENCOUNTER — HOSPITAL ENCOUNTER (OUTPATIENT)
Dept: MAMMOGRAPHY | Age: 79
Discharge: HOME | End: 2024-04-24
Attending: FAMILY MEDICINE
Payer: MEDICARE

## 2024-04-24 DIAGNOSIS — Z12.31 BREAST CANCER SCREENING BY MAMMOGRAM: ICD-10-CM

## 2024-04-24 DIAGNOSIS — E89.0 POSTABLATIVE HYPOTHYROIDISM: ICD-10-CM

## 2024-04-24 LAB — TSI SER-ACNC: 1.44 MIU/ML (ref 0.55–4.78)

## 2024-04-24 PROCEDURE — 84443 ASSAY THYROID STIM HORMONE: CPT

## 2024-04-24 PROCEDURE — 77063 BREAST TOMOSYNTHESIS BI: CPT | Performed by: FAMILY MEDICINE

## 2024-04-24 PROCEDURE — 36415 COLL VENOUS BLD VENIPUNCTURE: CPT

## 2024-04-24 PROCEDURE — 77067 SCR MAMMO BI INCL CAD: CPT | Performed by: FAMILY MEDICINE

## 2024-04-25 DIAGNOSIS — E89.0 POSTABLATIVE HYPOTHYROIDISM: ICD-10-CM

## 2024-04-25 RX ORDER — LEVOTHYROXINE SODIUM 88 UG/1
88 TABLET ORAL
Qty: 90 TABLET | Refills: 3 | Status: SHIPPED | OUTPATIENT
Start: 2024-04-25

## 2024-05-08 RX ORDER — ATORVASTATIN CALCIUM 80 MG/1
80 TABLET, FILM COATED ORAL NIGHTLY
Qty: 90 TABLET | Refills: 3 | Status: SHIPPED | OUTPATIENT
Start: 2024-05-08

## 2024-05-08 RX ORDER — LOSARTAN POTASSIUM 25 MG/1
50 TABLET ORAL DAILY
Qty: 180 TABLET | Refills: 3 | Status: SHIPPED | OUTPATIENT
Start: 2024-05-08

## 2024-05-08 NOTE — TELEPHONE ENCOUNTER
Refill passed per [facility] protocol.    Requested Prescriptions   Pending Prescriptions Disp Refills    ATORVASTATIN 80 MG Oral Tab [Pharmacy Med Name: ATORVASTATIN 80 MG TABLET] 90 tablet 3     Sig: TAKE 1 TABLET BY MOUTH EVERY DAY AT NIGHT       Cholesterol Medication Protocol Passed - 5/7/2024 12:08 AM        Passed - ALT < 80     Lab Results   Component Value Date    ALT 65 (H) 11/08/2023             Passed - ALT resulted within past year        Passed - Lipid panel within past 12 months     Lab Results   Component Value Date    CHOLEST 151 11/08/2023    TRIG 84 11/08/2023    HDL 53 11/08/2023    LDL 82 11/08/2023    VLDL 13 11/08/2023    NONHDLC 98 11/08/2023             Passed - In person appointment or virtual visit in the past 12 mos or appointment in next 3 mos     Recent Outpatient Visits              5 months ago [facility]    9 months ago Chronic cough    [facility], [facility] [place] Shankar Whitaker MD    Office Visit    11 months ago Type 2 diabetes mellitus without complication, without long-term current use of insulin (HCC)    [facility] [place] [facility]    Office Visit    1 year ago Encounter for annual health examination    [facility] [place] [facility]    Office Visit    1 year ago Elevated ALT measurement    [facility] Courtney Lomax APRN    Office Visit                        LOSARTAN 25 MG Oral Tab [Pharmacy Med Name: LOSARTAN POTASSIUM 25 MG TAB] 180 tablet 3     Sig: TAKE 2 TABLETS BY MOUTH EVERY DAY       Hypertension Medications Protocol Passed - 5/7/2024 12:08 AM        Passed - CMP or BMP in past 12 months        Passed - Last BP reading less than 140/90     BP Readings from Last 1 Encounters:    12/18/23 117/69               Passed - In person appointment or virtual visit in the past 12 mos or appointment in next 3 mos     Recent Outpatient Visits              5 months ago Encounter for annual physical exam    [facility], [facility] Elizbaeth Thomas MD    Office Visit    9 months ago Chronic cough    [facility], Shankar Camarillo MD    Office Visit    11 months ago Type 2 diabetes mellitus without complication, without long-term current use of insulin ([facility])    [facility] Elizabeth Thomas MD    Office Visit    1 year ago Encounter for annual health examination    [facility] [place]    Office Visit    1 year ago Elevated ALT measurement    [facility] Courtney Lomax APRN    Office Visit                      Passed - EGFRCR or GFRNAA > 50     GFR Evaluation  EGFRCR: 90 , resulted on 11/8/2023               Recent Outpatient Visits              5 months ago Encounter for annual physical exam    [facility] Elizabeth Thomas MD    Office Visit    9 months ago Chronic cough    [facility], Shankar Camarillo MD    Office Visit    11 months ago Type 2 diabetes mellitus without complication, without long-term current use of insulin ([facility])    [facility] Elizabeth Thomas MD    Office Visit    1 year ago Encounter for annual health examination    [facility] Elizabeth Thomas MD    Office Visit    1 year ago Elevated ALT measurement    [facility] Courtney Lomax APRN    Office Visit

## 2024-06-25 RX ORDER — HYDROCHLOROTHIAZIDE 12.5 MG/1
12.5 TABLET ORAL DAILY
Qty: 90 TABLET | Refills: 3 | Status: SHIPPED | OUTPATIENT
Start: 2024-06-25

## 2024-06-25 RX ORDER — METFORMIN HCL 500 MG
500 TABLET, EXTENDED RELEASE 24 HR ORAL
Qty: 90 TABLET | Refills: 3 | Status: SHIPPED | OUTPATIENT
Start: 2024-06-25

## 2024-06-25 NOTE — TELEPHONE ENCOUNTER
Please review. Protocol Failed; No Protocol    Requested Prescriptions   Pending Prescriptions Disp Refills    METFORMIN  MG Oral Tablet 24 Hr [Pharmacy Med Name: METFORMIN HCL  MG TABLET] 90 tablet 3     Sig: TAKE 1 TABLET BY MOUTH EVERY DAY WITH DINNER       Diabetes Medication Protocol Failed - 6/21/2024  9:49 AM        Failed - Last A1C < 7.5 and within past 6 months     Lab Results   Component Value Date    A1C 6.7 (H) 11/08/2023             Failed - In person appointment or virtual visit in the past 6 mos or appointment in next 3 mos     Recent Outpatient Visits              7 months ago [facility] physical exam    [facility]    [facility]    11 months ago Chronic cough    [facility], Shankar Camarillo MD    Office Visit    1 year ago Type 2 diabetes mellitus without complication, without long-term current use of insulin (HCC)    [facility] [place]    Office Visit    1 year ago Encounter for annual health examination    [facility] [place] [facility]    Office Visit    1 year ago Elevated ALT measurement    [facility] Courtney Lomax APRN    Office Visit                      Passed - Microalbumin procedure in past 12 months or taking ACE/ARB        Passed - EGFRCR or GFRNAA > 50     GFR Evaluation  EGFRCR: 90 , resulted on 11/8/2023          Passed - GFR in the past 12 months         Signed Prescriptions Disp Refills    HYDROCHLOROTHIAZIDE 12.5 MG Oral Tab 90 tablet 3     Sig: TAKE 1 TABLET BY MOUTH EVERY DAY       Hypertension Medications Protocol Passed - 6/21/2024  9:49 AM        Passed - CMP or BMP in past 12 months        Passed - Last BP reading less than 140/90     BP Readings from Last 1 Encounters:   12/18/23 117/69               Passed  - In person appointment or virtual visit in the past 12 mos or appointment in next 3 mos     Recent Outpatient Visits              7 months ago Encounter for annual physical exam    [facility], [facility] Elizabeth Thomas MD    Office Visit    11 months ago Chronic cough    [facility], Shankar Camarillo MD    Office Visit    1 year ago Type 2 diabetes mellitus without complication, without long-term current use of insulin ([facility])    [facility] Elizabeth Thomas MD    Office Visit    1 year ago Encounter for annual health examination    [facility] [place] [facility]    Office Visit    1 year ago Elevated ALT measurement    [facility] Courtney Lmoax APRN    Office Visit                      Passed - EGFRCR or GFRNAA > 50     GFR Evaluation  EGFRCR: 90 , resulted on 11/8/2023                   Recent Outpatient Visits              7 months ago Encounter for annual physical exam    [facility], [facility] Elizabeth Thomas MD    Office Visit    11 months ago Chronic cough    [facility], [facility], Shankar Camarillo MD    Office Visit    1 year ago Type 2 diabetes mellitus without complication, without long-term current use of insulin ([facility])    [facility] Elizabeth Thomas MD    Office Visit    1 year ago Encounter for annual health examination    [facility] Elizabeth Thomas MD    Office Visit    1 year ago Elevated ALT measurement    [facility] Courtney Lomax APRN    Office Visit

## 2024-06-25 NOTE — TELEPHONE ENCOUNTER
Please schedule patient for 6-month diabetes follow-up.  Let her know that we can do hemoglobin A1c in the office at the time of appointment.

## 2024-06-26 ENCOUNTER — PATIENT MESSAGE (OUTPATIENT)
Dept: FAMILY MEDICINE CLINIC | Facility: CLINIC | Age: 79
End: 2024-06-26

## 2024-06-26 DIAGNOSIS — E11.9 TYPE 2 DIABETES MELLITUS WITHOUT COMPLICATION, WITHOUT LONG-TERM CURRENT USE OF INSULIN (HCC): Primary | ICD-10-CM

## 2024-06-27 NOTE — TELEPHONE ENCOUNTER
There was another chain of messages about this.  Patient can either come in for hemoglobin A1c before appointment, I have signed the order.  Or we can do POC hemoglobin A1c.  Let her know we have that option now.  Her other labs are up-to-date.  CARLITO

## 2024-06-27 NOTE — TELEPHONE ENCOUNTER
From: Ashley Chaudhari  To: Elizabeth Thomas  Sent: 6/26/2024 12:45 PM CDT  Subject: July29 visit    Do I have to fast for this blood test?

## 2024-06-27 NOTE — TELEPHONE ENCOUNTER
Dr. Thomas, patient is scheduled for a diabetic follow up on 07/29/24, she is requesting labs to be completed prior to the visit. Please advise on pended orders.

## 2024-08-20 ENCOUNTER — OFFICE VISIT (OUTPATIENT)
Dept: FAMILY MEDICINE CLINIC | Facility: CLINIC | Age: 79
End: 2024-08-20
Payer: MEDICARE

## 2024-08-20 VITALS
SYSTOLIC BLOOD PRESSURE: 103 MMHG | DIASTOLIC BLOOD PRESSURE: 64 MMHG | BODY MASS INDEX: 24.43 KG/M2 | HEART RATE: 73 BPM | HEIGHT: 66 IN | WEIGHT: 152 LBS | OXYGEN SATURATION: 97 % | TEMPERATURE: 98 F

## 2024-08-20 DIAGNOSIS — M48.062 SPINAL STENOSIS OF LUMBAR REGION WITH NEUROGENIC CLAUDICATION: ICD-10-CM

## 2024-08-20 DIAGNOSIS — E11.9 TYPE 2 DIABETES MELLITUS WITHOUT COMPLICATION, WITHOUT LONG-TERM CURRENT USE OF INSULIN (HCC): ICD-10-CM

## 2024-08-20 DIAGNOSIS — R42 VERTIGO: Primary | ICD-10-CM

## 2024-08-20 LAB — HEMOGLOBIN A1C: 6.3 % (ref 4.3–5.6)

## 2024-08-20 PROCEDURE — 83036 HEMOGLOBIN GLYCOSYLATED A1C: CPT | Performed by: FAMILY MEDICINE

## 2024-08-20 PROCEDURE — 99214 OFFICE O/P EST MOD 30 MIN: CPT | Performed by: FAMILY MEDICINE

## 2024-08-20 NOTE — PROGRESS NOTES
Subjective:   Patient ID: Ashley Chaudhari is a 79 year old female.    Patient presents to follow-up on diabetes.  Also experiencing ongoing issues with vertigo and imbalance.    Vertigo  Associated symptoms include vertigo.       History/Other:   Review of Systems   Neurological:  Positive for vertigo.     Current Outpatient Medications   Medication Sig Dispense Refill    HYDROCHLOROTHIAZIDE 12.5 MG Oral Tab TAKE 1 TABLET BY MOUTH EVERY DAY 90 tablet 3    metFORMIN  MG Oral Tablet 24 Hr Take 1 tablet (500 mg total) by mouth daily with dinner. 90 tablet 3    pantoprazole 40 MG Oral Tab EC Take 1 tablet (40 mg total) by mouth before breakfast. 90 tablet 3    atorvastatin 80 MG Oral Tab Take 1 tablet (80 mg total) by mouth nightly. 90 tablet 3    losartan 25 MG Oral Tab Take 2 tablets (50 mg total) by mouth daily. 180 tablet 3    levothyroxine 88 MCG Oral Tab Take 1 tablet (88 mcg total) by mouth before breakfast. 90 tablet 3    escitalopram 20 MG Oral Tab Take 1 tablet (20 mg total) by mouth daily. 90 tablet 3    Cholecalciferol (VITAMIN D) 2000 units Oral Cap Take 2,000 tablets by mouth.      aspirin 81 MG Oral Tab Take  by mouth.      Multiple Vitamins-Minerals (MULTIVITAL) Oral Chew Tab Chew  by mouth.       Allergies:  Allergies   Allergen Reactions    Cat Hair Extract UNKNOWN and HIVES       Objective:   Physical Exam  Constitutional:       Appearance: Normal appearance.   Cardiovascular:      Rate and Rhythm: Normal rate and regular rhythm.      Pulses: Normal pulses.      Heart sounds: Normal heart sounds.   Pulmonary:      Effort: Pulmonary effort is normal.      Breath sounds: Normal breath sounds.   Musculoskeletal:      Right lower leg: No edema.      Left lower leg: No edema.   Neurological:      General: No focal deficit present.      Mental Status: She is alert and oriented to person, place, and time.      Comments: Hip flexor weakness.     Bilateral barefoot skin diabetic exam is normal,  visualized feet and the appearance is normal.  Bilateral monofilament/sensation of both feet is normal.  Pulsation pedal pulse exam of both lower legs/feet is normal as well.       Assessment & Plan:   1. Vertigo-intermittent episodes of vertigo, can occur with changing position, rolling over in bed.  At last visit discussed vestibular therapy, but she has not done this yet.  Today she also mentions imbalance when she turns quickly.  Unclear whether this is due to vestibular issues or possibly due to spinal stenosis or other CNS problem.  Plan to start with ENT evaluation, Dr. Topete.  If no evidence of vestibular dysfunction then plan to refer for neurology evaluation.  May use neurology at [place] as this is location of her neurosurgeon with history of NPH shunt.   2. Type 2 diabetes mellitus without complication, without long-term current use of insulin (HCC)-hemoglobin A1c 6.3.  Following diabetic diet, stable on current medications.   3. Spinal stenosis of lumbar region with neurogenic claudication-chronic low back pain.  Weakness and pain in anterior thighs with standing from seated position.  Seen at [facility] pain clinic.  Had 3 lumbar injections without success.  Took pregabalin which helped pain but caused significant side effects of fatigue and malaise, so she has discontinued.  She recalls having successful injection with Dr. Chand, may return.       Orders Placed This Encounter   Procedures    POC Glycohemoglobin [83827]    Comp Metabolic Panel (14)    Hemoglobin A1C    Lipid Panel    Microalb/Creat Ratio, Random Urine    Assay, Thyroid Stim Hormone    CBC, Platelet; No Differential       Meds This Visit:  Requested Prescriptions      No prescriptions requested or ordered in this encounter       Imaging & Referrals:  ENT - INTERNAL

## 2024-08-29 ENCOUNTER — NURSE TRIAGE (OUTPATIENT)
Dept: FAMILY MEDICINE CLINIC | Facility: CLINIC | Age: 79
End: 2024-08-29

## 2024-08-29 LAB — AMB EXT COVID-19 RESULT: DETECTED

## 2024-08-29 NOTE — TELEPHONE ENCOUNTER
Please let patient know I do recommend Paxlovid although her symptoms are mild, medication will decrease risk of any complications.  Most common side effects metallic taste.  Small possibility of rebound symptoms.  Continue with symptomatic care.  Note, notification that her pharmacy may not stock Paxlovid, please confirm Rx available.

## 2024-08-29 NOTE — TELEPHONE ENCOUNTER
Dr Melchor (pod mate =on behalf of Dr Thomas )=see below and advise, thanks.         Action Requested: Summary for Provider     []  Critical Lab, Recommendations Needed  [x] Need Additional Advice  []   FYI    [x]   Need Orders  [] Need Medications Sent to Pharmacy  []  Other     SUMMARY: COVID positive today (infection banner updated ), chronic mild dry cough x 1.5 weeks ,has been taking over the counter cough medication, no other symptoms reported .     Patient is high risk, last kidney function were normal.   Would like to know if Dr Thomas would recommends PAXLOVID with her mild symptoms .    CDC isolation guidelines provided.     Reason for call: Infection (COVID +)  Onset: 1.5 weeks     Advised urgent care or immediate care  for worsening symptoms and emergency room  for shortness of breath or chest pain.        Reason for Disposition   Cough with no complications   COVID-19 diagnosed by positive lab test (e.g., PCR, rapid self-test kit) and mild symptoms (e.g., cough, fever, others) and no complications or SOB    Protocols used: Cough-A-[place], Coronavirus (COVID-19) Diagnosed or [id number]

## 2024-11-26 ENCOUNTER — LAB ENCOUNTER (OUTPATIENT)
Dept: LAB | Age: 79
End: 2024-11-26
Attending: FAMILY MEDICINE
Payer: MEDICARE

## 2024-11-26 DIAGNOSIS — E11.9 TYPE 2 DIABETES MELLITUS WITHOUT COMPLICATION, WITHOUT LONG-TERM CURRENT USE OF INSULIN (HCC): ICD-10-CM

## 2024-11-26 LAB
ALBUMIN SERPL-MCNC: 4.2 G/DL (ref 3.2–4.8)
ALBUMIN/GLOB SERPL: 1.9 {RATIO} (ref 1–2)
ALP LIVER SERPL-CCNC: 64 U/L
ALT SERPL-CCNC: 48 U/L
ANION GAP SERPL CALC-SCNC: 9 MMOL/L (ref 0–18)
AST SERPL-CCNC: 28 U/L (ref ?–34)
BILIRUB SERPL-MCNC: 0.8 MG/DL (ref 0.2–1.1)
BUN BLD-MCNC: 23 MG/DL (ref 9–23)
BUN/CREAT SERPL: 31.1 (ref 10–20)
CALCIUM BLD-MCNC: 10.8 MG/DL (ref 8.7–10.4)
CHLORIDE SERPL-SCNC: 107 MMOL/L (ref 98–112)
CHOLEST SERPL-MCNC: 163 MG/DL (ref ?–200)
CO2 SERPL-SCNC: 26 MMOL/L (ref 21–32)
CREAT BLD-MCNC: 0.74 MG/DL
CREAT UR-SCNC: 83.5 MG/DL
DEPRECATED RDW RBC AUTO: 43.5 FL (ref 35.1–46.3)
EGFRCR SERPLBLD CKD-EPI 2021: 83 ML/MIN/1.73M2 (ref 60–?)
ERYTHROCYTE [DISTWIDTH] IN BLOOD BY AUTOMATED COUNT: 12.7 % (ref 11–15)
EST. AVERAGE GLUCOSE BLD GHB EST-MCNC: 137 MG/DL (ref 68–126)
FASTING PATIENT LIPID ANSWER: YES
FASTING STATUS PATIENT QL REPORTED: YES
GLOBULIN PLAS-MCNC: 2.2 G/DL (ref 2–3.5)
GLUCOSE BLD-MCNC: 137 MG/DL (ref 70–99)
HBA1C MFR BLD: 6.4 % (ref ?–5.7)
HCT VFR BLD AUTO: 42.8 %
HDLC SERPL-MCNC: 55 MG/DL (ref 40–59)
HGB BLD-MCNC: 14.4 G/DL
LDLC SERPL CALC-MCNC: 90 MG/DL (ref ?–100)
MCH RBC QN AUTO: 31.4 PG (ref 26–34)
MCHC RBC AUTO-ENTMCNC: 33.6 G/DL (ref 31–37)
MCV RBC AUTO: 93.2 FL
MICROALBUMIN UR-MCNC: 0.3 MG/DL
MICROALBUMIN/CREAT 24H UR-RTO: 3.6 UG/MG (ref ?–30)
NONHDLC SERPL-MCNC: 108 MG/DL (ref ?–130)
OSMOLALITY SERPL CALC.SUM OF ELEC: 300 MOSM/KG (ref 275–295)
PLATELET # BLD AUTO: 229 10(3)UL (ref 150–450)
POTASSIUM SERPL-SCNC: 3.5 MMOL/L (ref 3.5–5.1)
PROT SERPL-MCNC: 6.4 G/DL (ref 5.7–8.2)
RBC # BLD AUTO: 4.59 X10(6)UL
SODIUM SERPL-SCNC: 142 MMOL/L (ref 136–145)
TRIGL SERPL-MCNC: 100 MG/DL (ref 30–149)
TSI SER-ACNC: 2.11 UIU/ML (ref 0.55–4.78)
VLDLC SERPL CALC-MCNC: 16 MG/DL (ref 0–30)
WBC # BLD AUTO: 4.8 X10(3) UL (ref 4–11)

## 2024-11-26 PROCEDURE — 85027 COMPLETE CBC AUTOMATED: CPT

## 2024-11-26 PROCEDURE — 36415 COLL VENOUS BLD VENIPUNCTURE: CPT

## 2024-11-26 PROCEDURE — 84443 ASSAY THYROID STIM HORMONE: CPT

## 2024-11-26 PROCEDURE — 82570 ASSAY OF URINE CREATININE: CPT

## 2024-11-26 PROCEDURE — 80053 COMPREHEN METABOLIC PANEL: CPT

## 2024-11-26 PROCEDURE — 83036 HEMOGLOBIN GLYCOSYLATED A1C: CPT

## 2024-11-26 PROCEDURE — 82043 UR ALBUMIN QUANTITATIVE: CPT

## 2024-11-26 PROCEDURE — 80061 LIPID PANEL: CPT

## 2024-11-27 DIAGNOSIS — E83.52 HYPERCALCEMIA: Primary | ICD-10-CM

## 2024-12-03 ENCOUNTER — OFFICE VISIT (OUTPATIENT)
Dept: FAMILY MEDICINE CLINIC | Facility: CLINIC | Age: 79
End: 2024-12-03
Payer: MEDICARE

## 2024-12-03 ENCOUNTER — LAB ENCOUNTER (OUTPATIENT)
Dept: LAB | Age: 79
End: 2024-12-03
Attending: FAMILY MEDICINE
Payer: MEDICARE

## 2024-12-03 VITALS
HEART RATE: 81 BPM | SYSTOLIC BLOOD PRESSURE: 123 MMHG | DIASTOLIC BLOOD PRESSURE: 75 MMHG | TEMPERATURE: 98 F | BODY MASS INDEX: 25.83 KG/M2 | HEIGHT: 65 IN | OXYGEN SATURATION: 93 % | WEIGHT: 155 LBS

## 2024-12-03 DIAGNOSIS — K63.5 POLYP OF COLON, UNSPECIFIED PART OF COLON, UNSPECIFIED TYPE: ICD-10-CM

## 2024-12-03 DIAGNOSIS — K20.90 BARRETT'S ESOPHAGUS WITH ESOPHAGITIS: ICD-10-CM

## 2024-12-03 DIAGNOSIS — Z00.00 ENCOUNTER FOR ANNUAL HEALTH EXAMINATION: Primary | ICD-10-CM

## 2024-12-03 DIAGNOSIS — Z78.0 ASYMPTOMATIC MENOPAUSAL STATE: ICD-10-CM

## 2024-12-03 DIAGNOSIS — G91.2 NORMAL PRESSURE HYDROCEPHALUS (HCC): ICD-10-CM

## 2024-12-03 DIAGNOSIS — Z12.31 SCREENING MAMMOGRAM FOR BREAST CANCER: ICD-10-CM

## 2024-12-03 DIAGNOSIS — K22.70 BARRETT'S ESOPHAGUS WITH ESOPHAGITIS: ICD-10-CM

## 2024-12-03 DIAGNOSIS — F33.41 RECURRENT MAJOR DEPRESSIVE DISORDER, IN PARTIAL REMISSION (HCC): ICD-10-CM

## 2024-12-03 DIAGNOSIS — Z86.73 HISTORY OF CVA (CEREBROVASCULAR ACCIDENT): ICD-10-CM

## 2024-12-03 DIAGNOSIS — M85.80 OSTEOPENIA, UNSPECIFIED LOCATION: ICD-10-CM

## 2024-12-03 DIAGNOSIS — E11.9 TYPE 2 DIABETES MELLITUS WITHOUT COMPLICATION, WITHOUT LONG-TERM CURRENT USE OF INSULIN (HCC): ICD-10-CM

## 2024-12-03 DIAGNOSIS — E78.5 HYPERLIPIDEMIA, UNSPECIFIED HYPERLIPIDEMIA TYPE: ICD-10-CM

## 2024-12-03 DIAGNOSIS — E83.52 HYPERCALCEMIA: ICD-10-CM

## 2024-12-03 DIAGNOSIS — E89.0 POSTABLATIVE HYPOTHYROIDISM: ICD-10-CM

## 2024-12-03 PROBLEM — M17.11 PRIMARY OSTEOARTHRITIS OF RIGHT KNEE: Status: RESOLVED | Noted: 2018-10-06 | Resolved: 2024-12-03

## 2024-12-03 LAB
PTH-INTACT SERPL-MCNC: 80.4 PG/ML (ref 18.5–88)
VIT D+METAB SERPL-MCNC: 52.1 NG/ML (ref 30–100)

## 2024-12-03 PROCEDURE — 83970 ASSAY OF PARATHORMONE: CPT

## 2024-12-03 PROCEDURE — 36415 COLL VENOUS BLD VENIPUNCTURE: CPT

## 2024-12-03 PROCEDURE — G0439 PPPS, SUBSEQ VISIT: HCPCS | Performed by: FAMILY MEDICINE

## 2024-12-03 PROCEDURE — 82306 VITAMIN D 25 HYDROXY: CPT

## 2024-12-03 NOTE — PROGRESS NOTES
Subjective:   Ashley Chaudhari is a 79 year old female who presents for a Medicare Subsequent Annual Wellness visit (Pt already had Initial Annual Wellness) and  issues as below .   Generally feeling well    History/Other:   Fall Risk Assessment:   She has been screened for Falls and is low risk.      Cognitive Assessment:   Abnormal  What day of the week is this?: Correct  What month is it?: Correct  What year is it?: Correct  Recall \"Ball\": Incorrect  Recall \"Flag\": Correct  Recall \"Tree\": Correct    Functional Ability/Status:   Ashley Chaudhari has some abnormal functions as listed below:  She has Hearing problems based on screening of functional status.She has Vision problems based on screening of functional status.       Depression Screening (PHQ):  PHQ-2 SCORE: 0  , done 12/3/2024             Advanced Directives:   She does have a Living Will but we do NOT have it on file in Epic.    She does have a POA but we do NOT have it on file in [employer].    Patient has Advance Care Planning documents but we do not have a copy in EMR. Discussed Advanced Care Planning with patient and instructed patient to get our office a copy to be scanned into EMR.      Patient Active Problem List   Diagnosis    Type II diabetes mellitus (HCC)    Hyperlipidemia    Hypothyroidism    Colonic polyp    Osteopenia    Normal pressure hydrocephalus (HCC)    History of CVA (cerebrovascular accident)    Recurrent major depressive disorder, in partial remission (HCC)    Patton's esophagus with esophagitis     Allergies:  She is allergic to cat hair extract.    Current Medications:  Outpatient Medications Marked as Taking for the 12/3/24 encounter (Office Visit) with Elizabeth Thomas MD   Medication Sig    HYDROCHLOROTHIAZIDE 12.5 MG Oral Tab TAKE 1 TABLET BY MOUTH EVERY DAY    metFORMIN  MG Oral Tablet 24 Hr Take 1 tablet (500 mg total) by mouth daily with dinner.    pantoprazole 40 MG Oral Tab EC Take 1 tablet (40 mg total) by mouth before  breakfast.    atorvastatin 80 MG Oral Tab Take 1 tablet (80 mg total) by mouth nightly.    losartan 25 MG Oral Tab Take 2 tablets (50 mg total) by mouth daily.    levothyroxine 88 MCG Oral Tab Take 1 tablet (88 mcg total) by mouth before breakfast.    escitalopram 20 MG Oral Tab Take 1 tablet (20 mg total) by mouth daily.    Cholecalciferol (VITAMIN D) 2000 units Oral Cap Take 2,000 tablets by mouth.    aspirin 81 MG Oral Tab Take  by mouth.    Multiple Vitamins-Minerals (MULTIVITAL) Oral Chew Tab Chew  by mouth.       Medical History:  She  has a past medical history of Chronic right-sided low back pain without sciatica (09/05/2018), Colon polyps (2008), Depression, Detached retina, Diabetes (HCC), Disorder of thyroid, Elevated LFTs, Graves' disease, High blood pressure, High cholesterol, Hypothyroidism, L3-4 right mod far lateral, L4-5 mod central & right foraminal, L5-S1 right mild-mod far lateral bulging discs (09/05/2018), L4-5 grade 2 unstable, L3-4 grade 1 unstable spondylolisthesis (09/05/2018), L4-5 mod central, L3-4 right mild lateral recess stenosis (09/05/2018), L5-S1 mild central HNP (09/05/2018), left S1 radiculopathy (09/05/2018), Microscopic hematuria (1999), Migraine, Other and unspecified hyperlipidemia, Primary osteoarthritis of right knee: mild-mod medial joint and patellar OA (10/06/2018), and Stroke ([facility]).  Surgical History:  She  has a past surgical history that includes colonoscopy & polypectomy (05/29/2008); replace/revise brain shunt (01/2018); egd (12/18/2023); and needle biopsy right.   Family History:  Her family history includes Cancer in her father; Heart Surgery in her father.  Social History:  She  reports that she has never smoked. She has never used smokeless tobacco. She reports current alcohol use. She reports that she does not use drugs.    Tobacco:  She has never smoked tobacco.    CAGE Alcohol Screen:   CAGE screening score of 0 on 11/30/2024, showing low risk of alcohol  abuse.      Patient Care Team:  Elizabeth Thomas MD as PCP - General (Family Medicine)  Milo Chand MD as Consulting Physician (Physical Medicine)    Review of Systems     Negative except see below    Objective:   Physical Exam  General Appearance:  Alert, cooperative, no distress, appears stated age   Head:  Normocephalic, without obvious abnormality, atraumatic   Eyes:  PERRL, conjunctiva/corneas clear, EOM's intact both eyes   Ears:  Normal TM's and external ear canals, both ears   Nose: Nares normal, septum midline,mucosa normal, no drainage or sinus tenderness   Throat: Lips, mucosa, and tongue normal; teeth and gums normal   Neck: Supple, symmetrical, trachea midline, no adenopathy;  thyroid: not enlarged, symmetric, no tenderness/mass/nodules; no carotid bruit or JVD   Back:   Symmetric, no curvature, ROM normal, no CVA tenderness   Lungs:   Clear to auscultation bilaterally, respirations unlabored   Heart:  Regular rate and rhythm, S1 and S2 normal, no murmur, rub, or gallop   Abdomen:   Soft, non-tender, bowel sounds active all four quadrants,  no masses, no organomegaly   Pelvic: Deferred   Extremities: Extremities normal, atraumatic, no cyanosis or edema   Pulses: 2+ and symmetric   Skin: Skin color, texture, turgor normal, no rashes or lesions   Lymph nodes: Cervical, supraclavicular, and axillary nodes normal   Neurologic: Normal    and Breasts:  normal appearance, no masses or tenderness    /75   Pulse 81   Temp 97.6 °F (36.4 °C) (Oral)   Ht 5' 5\" (1.651 m)   Wt 155 lb (70.3 kg)   SpO2 93%   BMI 25.79 kg/m²  Estimated body mass index is 25.79 kg/m² as calculated from the following:    Height as of this encounter: 5' 5\" (1.651 m).    Weight as of this encounter: 155 lb (70.3 kg).    Medicare Hearing Assessment:   Hearing Screening    Time taken: 12/3/2024  2:06 PM  Entry User: Jebbit Busuu  Screening Method: Finger Rub  Finger Rub Result: Pass         Visual Acuity:   Right Eye  Visual Acuity: Corrected Right Eye Chart Acuity: 20/40   Left Eye Visual Acuity: Corrected Left Eye Chart Acuity: 20/25   Both Eyes Visual Acuity: Corrected Both Eyes Chart Acuity: 20/50   Able To Tolerate Visual Acuity: Yes        Assessment & Plan:   Ashley Chaudhari is a 79 year old female who presents for a Medicare Assessment.     1. Encounter for annual health examination (Primary)-patient is , adult children.  Exercise with exercise bicycle.  Seborrheic keratosis left clavicular region cryo today.  Small skin tag under left breast cut with scissors.  Chronic lumbar radicular pain, planning to make appointment with Dr. Chand.  Intermittent balance issues see previous visit.  Planning to make appointment with Dr. Topete, will also discuss with her neurosurgeon.  Reviewed recent labs with mild hypercalcemia.  Check additional labs as ordered.  2. Asymptomatic menopausal state  -     XR DEXA BONE DENSITOMETRY (CPT=77080); Future; Expected date: 01/03/2025  3. Normal pressure hydrocephalus (HCC)-followed by neurosurgery  Overview:   shunt 1/2018  4. Recurrent major depressive disorder, in partial remission (HCC)-stable on Lexapro  5. Type 2 diabetes mellitus without complication, without long-term current use of insulin (HCC)-hemoglobin A1c excellent on diabetic diet, metformin.  6. Hyperlipidemia, unspecified hyperlipidemia type-good on atorvastatin   7. Postablative hypothyroidism-recent TSH normal with levothyroxine 88 mcg daily  8. History of CVA (cerebrovascular accident)-continue daily aspirin, blood pressure and cholesterol control  Overview:  8/2019  9. Polyp of colon, unspecified part of colon, unspecified type  Overview:  9/2013, 11/2022 repeat 10 years if needed  10. Patton's esophagus with esophagitis  Overview:  11/2022, 12/2023 repeat in 3 year  11. Osteopenia, unspecified location  Overview:  1/2023- increased fracture risk.  Recommended restarting alendronate (more than 10 years ago),  patient's dentist recommended against.  At this time plan recheck bone density 2 years.  12. Screening mammogram for breast cancer  -     [facility] 2D+3D SCREENING BILAT (CPT=77067/09955); Future; Expected date: 04/24/2025    The patient indicates understanding of these issues and agrees to the plan.  Imaging studies ordered.  Lab work ordered.  Reinforced healthy diet, lifestyle, and exercise.      Return in about 6 months (around 6/3/2025) for hypertension.     Elizabeth Thomas MD, 12/3/2024     Supplementary Documentation:   General Health:  In the past six months, have you lost more than 10 pounds without trying?: (Patient-Rptd) 2 - No  Has your appetite been poor?: (Patient-Rptd) No  Type of Diet: (Patient-Rptd) Diabetic  How does the patient maintain a good energy level?: (Patient-Rptd) Appropriate Exercise  How would you describe your daily physical activity?: (Patient-Rptd) Moderate  How would you describe your current health state?: (Patient-Rptd) Good  How do you maintain positive mental well-being?: (Patient-Rptd) Social Interaction;Visiting Friends;Visiting Family  On a scale of 0 to 10, with 0 being no pain and 10 being severe pain, what is your pain level?: (Patient-Rptd) 5 - (Moderate)  In the past six months, have you experienced urine leakage?: (Patient-Rptd) 1-Yes  At any time do you feel concerned for the safety/well-being of yourself and/or your children, in your home or elsewhere?: No  Have you had any immunizations at another office such as Influenza, Hepatitis B, Tetanus, or Pneumococcal?: (Patient-Rptd) Yes    Health Maintenance   Topic Date Due    COVID-19 Vaccine (3 - 2024-25 season) 09/01/2024    Influenza Vaccine (1) 10/01/2024    Annual Physical  11/14/2024    Diabetes Care Dilated Eye Exam  02/01/2025    Mammogram  04/24/2025    Diabetes Care A1C  05/26/2025    Diabetes Care Foot Exam  08/20/2025    Diabetes Care: GFR  11/26/2025    Diabetes Care: Microalb/Creat Ratio  11/26/2025    DEXA  Scan  Completed    Annual Depression Screening  Completed    Fall Risk Screening (Annual)  Completed    Pneumococcal Vaccine: 65+ Years  Completed    Zoster Vaccines  Completed    Colorectal Cancer Screening  Discontinued

## 2025-02-27 NOTE — TELEPHONE ENCOUNTER
Please review: Medication passes protocol, but unable to refill due to medium/high/very high drug interaction warning copied here:    High  High Dose: escitalopram, 20 mg, Oral, DailySingle dose of 20 mg exceeds recommended maximum of 10 mg by 100%  Daily dose of 20 mg exceeds recommended maximum of 10 mg by 100%

## 2025-02-28 RX ORDER — ESCITALOPRAM OXALATE 20 MG/1
20 TABLET ORAL DAILY
Qty: 90 TABLET | Refills: 3 | Status: SHIPPED | OUTPATIENT
Start: 2025-02-28

## 2025-03-11 ENCOUNTER — HOSPITAL ENCOUNTER (OUTPATIENT)
Dept: BONE DENSITY | Facility: HOSPITAL | Age: 80
Discharge: HOME | End: 2025-03-11
Attending: FAMILY MEDICINE
Payer: MEDICARE

## 2025-03-11 DIAGNOSIS — Z78.0 ASYMPTOMATIC MENOPAUSAL STATE: ICD-10-CM

## 2025-03-11 PROCEDURE — 77080 DXA BONE DENSITY AXIAL: CPT | Performed by: FAMILY MEDICINE

## 2025-03-12 DIAGNOSIS — M85.859 OSTEOPENIA OF NECK OF FEMUR, UNSPECIFIED LATERALITY: Primary | ICD-10-CM

## 2025-03-21 ENCOUNTER — OFFICE VISIT (OUTPATIENT)
Dept: OTOLARYNGOLOGY | Facility: CLINIC | Age: 80
End: 2025-03-21
Payer: MEDICARE

## 2025-03-21 VITALS
OXYGEN SATURATION: 99 % | TEMPERATURE: 97 F | BODY MASS INDEX: 25 KG/M2 | HEART RATE: 79 BPM | RESPIRATION RATE: 19 BRPM | WEIGHT: 152 LBS | SYSTOLIC BLOOD PRESSURE: 142 MMHG | DIASTOLIC BLOOD PRESSURE: 67 MMHG

## 2025-03-21 DIAGNOSIS — H81.11 BENIGN PAROXYSMAL POSITIONAL VERTIGO OF RIGHT EAR: Primary | ICD-10-CM

## 2025-03-21 PROCEDURE — 99204 OFFICE O/P NEW MOD 45 MIN: CPT | Performed by: OTOLARYNGOLOGY

## 2025-03-21 NOTE — PROGRESS NOTES
The following individual(s) verbally consented to be recorded using ambient AI listening technology and understand that they can each withdraw their consent to this listening technology at any point by asking the clinician to turn off or pause the recording:  Yes patient consents     Patient name: Ashley Chaudhari  Additional names:  chiquita

## 2025-03-24 NOTE — PROGRESS NOTES
NEW PATIENT PROGRESS NOTE  OTOLOGY/OTOLARYNGOLOGY    REF MD:  No referring provider defined for this encounter.    PCP: Elizabeth Thomas MD    CHIEF COMPLAINT:    Chief Complaint   Patient presents with    Dizziness     Patient is here for dizziness x 1 year reports nausea    Ashley/chiquita       History of Present Illness  Ashley Chaudhari is a 79 year old female with history of low pressure hydrocephalus with  shunt in place who presents with dizziness and imbalance. She was referred by Dr. Thomas for evaluation of vertigo.    She has been experiencing episodes of dizziness and imbalance for the past year and a half, which have been worsening. The dizziness is described as a sensation of being pushed back down on the bed, necessitating lying flat. These episodes occur intermittently, lasting a few seconds to a minute, and are not constant. She has difficulty getting up from a chair and feels off balance when walking, particularly after parking the car, requiring her to hold onto someone for support.    She has a history of low pressure hydrocephalus for which a shunt was placed by Dr. Taylor at [facility]. The shunt was recently checked and is functioning properly. There is no indication that shunt malfunction is contributing to her current symptoms of dizziness and imbalance.    She denies any current headaches but has a history of migraines that began after having her daughter and ceased after menopause. She has not experienced visual symptoms with her migraines.    She has not seen an ENT for her hearing issues but uses hearing aids. A recent hearing test is not available, and an updated test is recommended for future visits.        PAST MEDICAL HISTORY:    Past Medical History:    Chronic right-sided low back pain without sciatica    Colon polyps    colonic polyps    Depression    Detached retina    Dr Villa    Diabetes (HCC)    Disorder of thyroid    Elevated LFTs    w/u Neg's    Graves' disease    s/p DENISE Crouch  hypothyroid    High blood pressure    High cholesterol    Hypothyroidism    s/p WALKER   Dr Crouch    L3-4 right mod far lateral, L4-5 mod central & right foraminal, L5-S1 right mild-mod far lateral bulging discs    L4-5 grade 2 unstable, L3-4 grade 1 unstable spondylolisthesis    L4-5 mod central, L3-4 right mild lateral recess stenosis    L5-S1 mild central HNP    left S1 radiculopathy    Microscopic hematuria    Migraine    Other and unspecified hyperlipidemia    hypercholesterol    Primary osteoarthritis of right knee: mild-mod medial joint and patellar OA    Stroke (HCC)       PAST SURGICAL HISTORY:    Past Surgical History:   Procedure Laterality Date    Colonoscopy & polypectomy  05/29/2008    Egd  12/18/2023    with biopsy    Needle biopsy right      2014    Replace/revise brain shunt  01/2018       Medications Ordered Prior to Encounter[1]    Allergies: Allergies[2]    SOCIAL HISTORY:    Social History     Tobacco Use    Smoking status: Never    Smokeless tobacco: Never   Substance Use Topics    Alcohol use: Yes     Comment: on occasion        Family History   Problem Relation Age of Onset    Heart Surgery Father         CABG 70s    Cancer Father         biliary       REVIEW OF SYSTEMS:   PER HPI    EXAMINATION:  I washed my hands with an alcohol-based hand gel prior to examination  Constitutional:   --Vitals: Blood pressure 142/67, pulse 79, temperature 97 °F (36.1 °C), temperature source Oral, resp. rate 19, weight 152 lb (68.9 kg), SpO2 99%.  General: no apparent distress, well-developed  Neuro: Cranial nerves: EOMI, Facial sensation intact to touch, palate elevates midline, tongue protrudes midline, shoulder shrug intact bilateral, facial movement normal bilateral  Respiratory: No stridor, stertor or increased work of breathing  ENT:  --Ear: (bilateral ears were examined under binocular microscopy)  Right ear microscopic exam:  Pinna: Normal, no lesions or masses.  Mastoid: Nontender on palpation.    External auditory canal: Clear, no masses or lesions.  Tympanic membrane: Intact, no lesions, normal landmarks.  Middle ear: Aerated.    Left ear microscopic exam:  Pinna: Normal, no lesions or masses.  Mastoid: Nontender on palpation.   External auditory canal: Clear, no masses or lesions.  Tympanic membrane: Intact, no lesions, normal landmarks.  Middle ear: Aerated.    Vestibular examination (3/21/2024): Performed with infrared frenzel goggles  No neutral gaze nystagmus  No headshake nystagmus  Hallpike right: rotatory geotropic nystagmus, Hallpike left: negative  No aversion to OPK stimuli      ASSESSMENT/PLAN:  Ashley Chaudhari is a 79 year old female with     ICD-10-CM   1. Benign paroxysmal positional vertigo of right ear  H81.11      Assessment & Plan  Benign Paroxysmal Positional Vertigo (BPPV)  BPPV on the right side due to loose otoconia, exacerbated by a shunt. Vestibular migraine possible but BPPV primary. Physical therapy expected to be 90% effective within four treatments.  - Refer to specialized physical therapist for maneuvers.  - Provided information about BPPV and treatment.  - Schedule follow-up in two months to assess improvement.    Hearing Loss  Uses hearing aids, no recent audiogram. Hearing loss may be associated with vertigo.  - Obtain audiogram at next visit.    Normal Pressure Hydrocephalus  Managed with functioning shunt. Imbalance not related to hydrocephalus.  - Continue follow-up with neurosurgery clinic for shunt management.    Situation reviewed with the patient in detail.    Cordell Wesley MD  Otology/Otolaryngology  [facility] [address] [place] [address]  Phone [phone]  Fax [phone]        [1]   Current Outpatient Medications on File Prior to Visit   Medication Sig Dispense Refill    escitalopram 20 MG Oral Tab Take 1 tablet (20 mg total) by mouth daily. 90 tablet 3    HYDROCHLOROTHIAZIDE 12.5 MG Oral Tab TAKE 1  TABLET BY MOUTH EVERY DAY 90 tablet 3    metFORMIN  MG Oral Tablet 24 Hr Take 1 tablet (500 mg total) by mouth daily with dinner. 90 tablet 3    pantoprazole 40 MG Oral Tab EC Take 1 tablet (40 mg total) by mouth before breakfast. 90 tablet 3    atorvastatin 80 MG Oral Tab Take 1 tablet (80 mg total) by mouth nightly. 90 tablet 3    losartan 25 MG Oral Tab Take 2 tablets (50 mg total) by mouth daily. 180 tablet 3    levothyroxine 88 MCG Oral Tab Take 1 tablet (88 mcg total) by mouth before breakfast. 90 tablet 3    Cholecalciferol (VITAMIN D) 2000 units Oral Cap Take 2,000 tablets by mouth.      aspirin 81 MG Oral Tab Take  by mouth.      Multiple Vitamins-Minerals (MULTIVITAL) Oral Chew Tab Chew  by mouth.       No current facility-administered medications on file prior to visit.   [2]   Allergies  Allergen Reactions    Cat Hair Extract UNKNOWN and HIVES

## 2025-04-16 ENCOUNTER — OFFICE VISIT (OUTPATIENT)
Dept: PHYSICAL THERAPY | Facility: HOSPITAL | Age: 80
End: 2025-04-16
Attending: OTOLARYNGOLOGY
Payer: MEDICARE

## 2025-04-16 DIAGNOSIS — H81.11 BENIGN PAROXYSMAL POSITIONAL VERTIGO OF RIGHT EAR: Primary | ICD-10-CM

## 2025-04-16 PROCEDURE — 97162 PT EVAL MOD COMPLEX 30 MIN: CPT

## 2025-04-16 NOTE — PROGRESS NOTES
VESTIBULAR EVALUATION:     Diagnosis:   BPPV right Patient:  Ashley Chaudhari (79 year old, female)        Referring Provider: Cordell Wesley  Today's Date   4/16/2025    Precautions:  Fall Risk   Date of Evaluation: 04/16/25  Next MD visit: N/A  Date of Surgery: N/A     PATIENT SUMMARY   Summary of chief complaints:    History of current condition: Pt. reports 2 year history of intermittent positional vertigo, dizziness and imbalance with turning and movement.  She saw Dr. Efrem Wesley who dx with BPPV and referred to PT.  Pt. has  shunt 5 years and recent checkup showed good functioning. Pt. was not having any dizziness or balance impairment after shunt placement until current condition.   Pain level: current 0 /10, at best 0 /10, at worst 0 /10  Social History: lives with , stairs to enter and basement with railings   Occupation: none   Leisure activities/Hobbies: sew, read, swim in summer, recumbent bike 20-30 min   Prior level of function: indep no functional limitations  Current limitations: difficulty with bed mobility, looking/reaching up, turning quickly, working in the kitchen, bending over  Pt goals: improve balance, no dizziness  Symptoms with cough/sneeze or loud noise: No  Falls: Yes got up from couch, turned to left and fell 2 weeks ago, no injury    Hx of migraines: Yes none since menopause   Hx of vision issue: No    Hx of hearing issues: hearing aides    Dizziness: Current: 1 /10, Best: 0 /10, Worst:6 /10  Quality: spinning dizziness with position changes, imbalance with upright activities, sit to stand  Frequency/Duration:     Aggravates: supine to/from sit; sit to stand; rolling; bending over; looking/reaching up; turning/direction changes; shopping; visual motion; quick head movements; repetitive movements   Relieves: sitting down; not moving     History of Headaches: negative   History of Cervical Pain: negative    Dizziness Handicap Inventory: (Patient-Rptd) 48 - Moderate  Handicap    Past medical history was reviewed with Ashley.  Significant findings include:   shunt for NPH  Imaging/Tests:   N/A  Ashley  has a past medical history of Chronic right-sided low back pain without sciatica (09/05/2018), Colon polyps (2008), Depression, Detached retina, Diabetes (HCC), Disorder of thyroid, Elevated LFTs, Graves' disease, High blood pressure, High cholesterol, Hypothyroidism, L3-4 right mod far lateral, L4-5 mod central & right foraminal, L5-S1 right mild-mod far lateral bulging discs (09/05/2018), L4-5 grade 2 unstable, L3-4 grade 1 unstable spondylolisthesis (09/05/2018), L4-5 mod central, L3-4 right mild lateral recess stenosis (09/05/2018), L5-S1 mild central HNP (09/05/2018), left S1 radiculopathy (09/05/2018), Microscopic hematuria (1999), Migraine, Other and unspecified hyperlipidemia, Primary osteoarthritis of right knee: mild-mod medial joint and patellar OA (10/06/2018), and Stroke (HCC).  She  has a past surgical history that includes colonoscopy & polypectomy (05/29/2008); replace/revise brain shunt (01/2018); egd (12/18/2023); and needle biopsy right.    ASSESSMENT  Ashley presents to physical therapy evaluation with primary c/o positional dizziness. The results of the objective tests and measures show no evidence of vestibular hypofunction or BPPV, but pt's symptoms are consistent with vestibular impairment.  Pt. May have had BPPV that has resolved, or testing today was a false negative.  No intervention performed as pt. Was asymptomatic and no nystagmus was visualized. Pt also reports impaired balance so will assess next visit and intervene as appropriate.  Functional deficits include but are not  limited to difficulty with lying flat or rolling over in bed, difficulty picking up objects from floor, turning and changing directions while walking, reaching up onto high shelves.  PT discussed evaluation findings, plan of care, BPPV pathology and management.  No HEP is  appropriate today.  Pt. Will return next session to ensure that BPPV is negative without further residual symptoms.  Skilled Physical Therapy is medically necessary to address the above impairments and reach functional goals.      OBJECTIVE:    Musculoskeltal:  Posture/Observation: arrived without assistive device, slow gait   Cervical ROM WNL in all directions  Adverse neuro signs with ROM: No    Neurological:  Neuro Screen: Sensation: WNL for light touch screen; no reports of paresthesias    Coordination Testing:   Finger to Nose: WNL   Pronation/supination: WNL  Toe tapping:  WNL      Oculomotor & Vestibular Exam:  Extraocular Range of Motion: normal  Spontaneous Nystagmus:        room light: None         fixation blocked: None   Smooth Pursuit: normal  Saccades: normal   Gaze Evoked Nystagmus:        room light: normal       fixation blocked: normal   Head Impulse Test: normal bilaterally   VOR screen:  normal     VOR Cancellation: normal;    Convergence: normal;     Cover/Uncover: normal   Cross Cover: normal   Head Shaking Nystagmus: not tested       duration:    Dynamic Visual Acuity: not tested       degraded lines:  ; symptoms provoked:    Oculomotor Exam Comments:    Positional Testing:  Paoli Hallpike Right - negative, no symptoms Amos Hallpike Left - negative, no symptoms Horizontal Roll Test-  negative, no symptoms    Balance and Functional Mobility:  Postural Control:   TBA     Functional Mobility:   Gait: pt ambulates on level ground with path deviation with visual scanning and slow speed.  Functional Gait Assessment (FGA):  TBA   Five Times Sit to Stand Test:  TBA     Today’s Treatment and Response:   Pt education was provided on exam findings, treatment diagnosis, treatment plan, expectations, and prognosis.   Today's Treatment       4/16/2025   Vestibular Treatment   Evaluation Minutes 40   Total Time Of Timed Procedures 0   Total Time Of Service-Based Procedures 40   Total Treatment Time 40   HEP  Patient was issued written information regarding the pathology and management of BPPV.  No other home exercise program is appropriate at this time.        Patient was instructed in and issued a HEP for: Patient was issued written information regarding the pathology and management of BPPV.  No other home exercise program is appropriate at this time.    Pt was also provided recommendations for: detrimental fear avoidance behaviors; importance of remaining active; strategies to reduce fall risk at home; symptom management; possible dizziness after evaluation.    Charges:  PT EVAL: Moderate Complexity, Eval  In agreement with evaluation findings and clinical rationale, this evaluation involved MODERATE COMPLEXITY decision making due to 1-2 personal factors/comorbidities, 3 or more body structures involved/activity limitations, and evolving symptoms as documented in the evaluation.     PLAN OF CARE:    Goals: (to be met in 10 visits)   1.  Negative Amos-Hallpike and roll tests.  2.  Able to perform position changes such as supine to/from sit and bending over to the floor without dizziness to improve safety in functional tasks.  3.  Pt. Able to ambulate with horizontal head turns for visual scanning without path deviation or dizziness to improve safety during gait.       Frequency / Duration: Patient will be seen 1x/week or a total of 10 visits over a 90 day period. Treatment will include: neuromuscular re-education; balance training; gait training; eye/head coordination training; sensory organization training; habituation training for motion sensitivity and/or visual motion intolerance; canalith repositioning maneuver; symptom management instruction; therapeutic activities; patient education; home exercise program development and instruction    Education or treatment limitation: None   Rehab Potential: good     Patient/Family was advised of these findings, precautions, and treatment options and has agreed to actively  participate in planning and for this course of care.     Thank you for your referral. Please co-sign or sign and return this letter via fax as soon as possible to [phone]. If you have any questions, please contact me at Dept: [phone]    Sincerely,  Electronically signed by therapist: Malka Tony PT  Physician's certification required: Yes  I certify the need for these services furnished under this plan of treatment and while under my care.    X___________________________________________________ Date____________________    Certification From: 4/16/2025  To:7/15/2025

## 2025-04-18 ENCOUNTER — OFFICE VISIT (OUTPATIENT)
Dept: PHYSICAL THERAPY | Facility: HOSPITAL | Age: 80
End: 2025-04-18
Attending: OTOLARYNGOLOGY
Payer: MEDICARE

## 2025-04-18 PROCEDURE — 97110 THERAPEUTIC EXERCISES: CPT

## 2025-04-18 PROCEDURE — 97112 NEUROMUSCULAR REEDUCATION: CPT

## 2025-04-18 NOTE — PROGRESS NOTES
Patient: Ashley Chaudhari (79 year old, female) Referring Provider:  Insurance:   Diagnosis: BPPV right No ref. provider found  MEDICARE   Date of Surgery: N/A Next MD visit:  FROILAN COMER   Precautions:  Fall Risk N/A Referral Information:    Date of Evaluation: Req: 0, Auth: 0, Exp:     04/16/25 POC Auth Visits:  10       Today's Date   4/18/2025    Subjective  Pt. reports no significant change from last session- no positional spinning, but does feel off balance much of the time.  She reports that she has bilat knee pain that limits her activity, tends to be sedentary.  She has a stationary bike that she uses up to 2x/week, 20 min.       Pain: 5/10 (knee pain when getting up.  Pt. in PT for balance, pain is not a goal.)     Objective  see flow sheet         Assessment  Pt. has impaired strength bilat LEs that are limiting balance.  Pt. is considered a fall risk.  She was unable to tolerate sit to/from stand without UE support for HEP due to knee pain.  She also is visually dependent, very unstable with eyes closed.  Pt. tolerated session well, no pain at end of session.    Goals (to be met in 10 visits)   1.  Negative Amos-Hallpike and roll tests.  2.  Able to perform position changes such as supine to/from sit and bending over to the floor without dizziness to improve safety in functional tasks.  3.  Pt. Able to ambulate with horizontal head turns for visual scanning without path deviation or dizziness to improve safety during gait.           Plan  FGA    Treatment Last 4 Visits  Treatment Day: 2       4/16/2025 4/18/2025   Vestibular Treatment   Therapeutic Exercise  Bridges x 10  SLR x 10 ea  Sidelying hip abd x 10 ea  Bilat heel raises with UE support x 15    Strength assessment:   Hip abd, ext 3+/5  Hip flexion 4/5  Knee flex 3+/5  Knee ext 4-/5 (pain)  Dorsiflexion 5/5  Plantarflexion L: 2+/5;  R: 3-/5   Neuro Re-Education  Romberg EO: 30 sec inc sway;  EC: 30 sec very challenging  Standing feet apart, EC with  slow weightshift     Therapeutic Activity  Five times sit to stand test:  16.8 sec     Gait Training  --   Therapeutic Exercise Minutes  30   Neuro Re-Educ Minutes  10   Therapeutic Activity Minutes  5   Evaluation Minutes 40    Total Time Of Timed Procedures 0 45   Total Time Of Service-Based Procedures 40 0   Total Treatment Time 40 45   HEP Patient was issued written information regarding the pathology and management of BPPV.  No other home exercise program is appropriate at this time.   Access Code: [email or web address]  URL: [email or web address]  Date: 04/18/2025  Prepared by: Malka Tony    Exercises  - Supine Bridge  - 1 x daily - 7 x weekly - 2 sets - 10 reps  - Sidelying Hip Abduction  - 1 x daily - 7 x weekly - 10 reps  - Active Straight Leg Raise with Quad Set  - 1 x daily - 7 x weekly - 10 reps  - Heel Raises with Counter Support  - 1 x daily - 7 x weekly - 2 sets - 10 reps  - Stand with eyes closed- grounding  - 1 x daily - 7 x weekly - 2 sets - 30 second hold        HEP  Access Code: [email or web address]  URL: [email or web address]  Date: 04/18/2025  Prepared by: Malka Tony    Exercises  - Supine Bridge  - 1 x daily - 7 x weekly - 2 sets - 10 reps  - Sidelying Hip Abduction  - 1 x daily - 7 x weekly - 10 reps  - Active Straight Leg Raise with Quad Set  - 1 x daily - 7 x weekly - 10 reps  - Heel Raises with Counter Support  - 1 x daily - 7 x weekly - 2 sets - 10 reps  - Stand with eyes closed- grounding  - 1 x daily - 7 x weekly - 2 sets - 30 second hold    Charges  TE x 2, NMR x 1

## 2025-04-25 DIAGNOSIS — E89.0 POSTABLATIVE HYPOTHYROIDISM: ICD-10-CM

## 2025-04-29 RX ORDER — ATORVASTATIN CALCIUM 80 MG/1
80 TABLET, FILM COATED ORAL NIGHTLY
Qty: 90 TABLET | Refills: 3 | Status: SHIPPED | OUTPATIENT
Start: 2025-04-29

## 2025-04-29 RX ORDER — LEVOTHYROXINE SODIUM 88 UG/1
88 TABLET ORAL
Qty: 90 TABLET | Refills: 3 | Status: SHIPPED | OUTPATIENT
Start: 2025-04-29

## 2025-04-29 RX ORDER — PANTOPRAZOLE SODIUM 40 MG/1
40 TABLET, DELAYED RELEASE ORAL
Qty: 90 TABLET | Refills: 3 | Status: SHIPPED | OUTPATIENT
Start: 2025-04-29

## 2025-04-29 RX ORDER — LOSARTAN POTASSIUM 25 MG/1
50 TABLET ORAL DAILY
Qty: 180 TABLET | Refills: 3 | Status: SHIPPED | OUTPATIENT
Start: 2025-04-29

## 2025-04-29 NOTE — TELEPHONE ENCOUNTER
Please review.  Protocol failed/has no protocol.    Last Office Visit 12/03/2024  Please advise if refill is appropriate.    Requested Prescriptions     Pending Prescriptions Disp Refills    LOSARTAN 25 MG Oral Tab [Pharmacy Med Name: LOSARTAN POTASSIUM 25 MG TAB] 180 tablet 3     Sig: TAKE 2 TABLETS BY MOUTH EVERY DAY

## 2025-04-30 ENCOUNTER — HOSPITAL ENCOUNTER (OUTPATIENT)
Dept: MAMMOGRAPHY | Age: 80
Discharge: HOME | End: 2025-04-30
Attending: FAMILY MEDICINE
Payer: MEDICARE

## 2025-04-30 DIAGNOSIS — Z12.31 SCREENING MAMMOGRAM FOR BREAST CANCER: ICD-10-CM

## 2025-04-30 PROCEDURE — 77067 SCR MAMMO BI INCL CAD: CPT | Performed by: FAMILY MEDICINE

## 2025-04-30 PROCEDURE — 77063 BREAST TOMOSYNTHESIS BI: CPT | Performed by: FAMILY MEDICINE

## 2025-05-01 ENCOUNTER — OFFICE VISIT (OUTPATIENT)
Dept: PHYSICAL THERAPY | Facility: HOSPITAL | Age: 80
End: 2025-05-01
Attending: OTOLARYNGOLOGY
Payer: MEDICARE

## 2025-05-01 PROCEDURE — 97112 NEUROMUSCULAR REEDUCATION: CPT

## 2025-05-01 PROCEDURE — 97116 GAIT TRAINING THERAPY: CPT

## 2025-05-01 NOTE — PROGRESS NOTES
Patient: Ashley Chaudhari (79 year old, female) Referring Provider:  Insurance:   Diagnosis: BPPV right, gait instability Cordell Topete Malhotra MEDICARE   Date of Surgery: N/A Next MD visit:  FROILAN COMER   Precautions:  Fall Risk N/A Referral Information:    Date of Evaluation: Req: 0, Auth: 0, Exp:     No data recorded POC Auth Visits:  10       Today's Date   5/1/2025    Subjective  Pt. reports that she has been doing her exercises.  She has not had positional spinning, still primarily feels off balance. She reports that she has bilat knee pain that limits her activity.       Pain: 4/10 (knee pain- not being addressed with this therapy)     Objective  see flow sheet          Assessment  Pt's functional testing scores today are in fall risk category. Pt. a little improved in eyes closed today.  Pt. tolerated session well, no change in pain at end of session.    Goals (to be met in 10 visits)   1.  Negative [place]-Hallpike and roll tests.  2.  Able to perform position changes such as supine to/from sit and bending over to the floor without dizziness to improve safety in functional tasks.  3.  Pt. Able to ambulate with horizontal head turns for visual scanning without path deviation or dizziness to improve safety during gait.               Plan  F/B gait with turns, sidestepping, standing trunk rotation    Treatment Last 4 Visits  Treatment Day: 3       4/16/2025 4/18/2025 5/1/2025   Vestibular Treatment   Therapeutic Exercise  Bridges x 10  SLR x 10 ea  Sidelying hip abd x 10 ea  Bilat heel raises with UE support x 15    Strength assessment:   Hip abd, ext 3+/5  Hip flexion 4/5  Knee flex 3+/5  Knee ext 4-/5 (pain)  Dorsiflexion 5/5  Plantarflexion L: 2+/5;  R: 3-/5    Neuro Re-Education  Romberg EO: 30 sec inc sway;  EC: 30 sec very challenging  Standing feet apart, EC with slow weightshift   Four square step test: 15 sec= fall risk  Four square stepping L/R x 3 ea way  Tap ups x 10 ea LE on 8 in step  Standing eyes  closed x 1 min with L/R, A/P weightshifting    Discussed visual dependence and improving somatosensory feedback   Therapeutic Activity  Five times sit to stand test:  16.8 sec      Gait Training  -- Functional Gait Assessment   Item Description Score (0 worst, 3 best)    ___2____1. Gait Level Surface-  Time: ____6.2______seconds  ___3____2. Change in gait speed  __2_____3. Gait with horizontal head turns   __2_____4. Gait with vertical head turns  __3_____5. Gait and pivot turn  __3_____6. Step over obstacle  ___0____7. Gait with narrow base of support-  # of steps_____0___  __1_____8. Gait with eyes closed-  Time: _____15_____seconds  ___1____9. Ambulating backward  ___1____10. Steps    TOTAL SCORE: __18_____/30    (less than 22/30 = fall risk)    Gait with horiz and vertical head turns 40 ft x 4  F/B gait with turns x 40 ft       Therapeutic Exercise Minutes  30    Neuro Re-Educ Minutes  10 15   Therapeutic Activity Minutes  5    Gait Training Minutes   25   Evaluation Minutes 40     Total Time Of Timed Procedures 0 45 40   Total Time Of Service-Based Procedures 40 0 0   Total Treatment Time 40 45 40   HEP Patient was issued written information regarding the pathology and management of BPPV.  No other home exercise program is appropriate at this time.   Access Code: [email or web address]  URL: [email or web address]  Date: 04/18/2025  Prepared by: Malka Tony    Exercises  - Supine Bridge  - 1 x daily - 7 x weekly - 2 sets - 10 reps  - Sidelying Hip Abduction  - 1 x daily - 7 x weekly - 10 reps  - Active Straight Leg Raise with Quad Set  - 1 x daily - 7 x weekly - 10 reps  - Heel Raises with Counter Support  - 1 x daily - 7 x weekly - 2 sets - 10 reps  - Stand with eyes closed- grounding  - 1 x daily - 7 x weekly - 2 sets - 30 second hold Access Code: [email or web address]  URL: [email or web address]  Date: 05/01/2025  Prepared by: Malka Tony    Exercises  - Supine Bridge  - 1 x daily - 7 x weekly -  2 sets - 10 reps  - Sidelying Hip Abduction  - 1 x daily - 7 x weekly - 10 reps  - Active Straight Leg Raise with Quad Set  - 1 x daily - 7 x weekly - 10 reps  - Heel Raises with Counter Support  - 1 x daily - 7 x weekly - 2 sets - 10 reps  - Stand with eyes closed- grounding  - 1 x daily - 7 x weekly - 2 sets - 30 second hold  - Walking with Head Rotation  - 1 x daily - 7 x weekly - 2 sets - 5 reps  - Step Taps on Low Step  - 1 x daily - 7 x weekly - 10 reps        HEP  Access Code: [email or web address]  URL: [email or web address]  Date: 05/01/2025  Prepared by: Malka Tony    Exercises  - Supine Bridge  - 1 x daily - 7 x weekly - 2 sets - 10 reps  - Sidelying Hip Abduction  - 1 x daily - 7 x weekly - 10 reps  - Active Straight Leg Raise with Quad Set  - 1 x daily - 7 x weekly - 10 reps  - Heel Raises with Counter Support  - 1 x daily - 7 x weekly - 2 sets - 10 reps  - Stand with eyes closed- grounding  - 1 x daily - 7 x weekly - 2 sets - 30 second hold  - Walking with Head Rotation  - 1 x daily - 7 x weekly - 2 sets - 5 reps  - Step Taps on Low Step  - 1 x daily - 7 x weekly - 10 reps    Charges  NMR x 1, Gait x 2

## 2025-05-06 ENCOUNTER — APPOINTMENT (OUTPATIENT)
Dept: PHYSICAL THERAPY | Facility: HOSPITAL | Age: 80
End: 2025-05-06
Attending: OTOLARYNGOLOGY
Payer: MEDICARE

## 2025-05-14 ENCOUNTER — OFFICE VISIT (OUTPATIENT)
Dept: PHYSICAL THERAPY | Facility: HOSPITAL | Age: 80
End: 2025-05-14
Attending: OTOLARYNGOLOGY
Payer: MEDICARE

## 2025-05-14 PROCEDURE — 97112 NEUROMUSCULAR REEDUCATION: CPT

## 2025-05-14 PROCEDURE — 97116 GAIT TRAINING THERAPY: CPT

## 2025-05-14 NOTE — PROGRESS NOTES
Patient: Ashley Chaudhari (79 year old, female) Referring Provider:  Insurance:   Diagnosis: BPPV right, gait instability Cordell Topete Malhotra MEDICARE   Date of Surgery: N/A Next MD visit:  FROILAN COMER   Precautions:  Fall Risk N/A Referral Information:    Date of Evaluation: Req: 0, Auth: 0, Exp:     No data recorded POC Auth Visits:  10       Today's Date   5/14/2025    Subjective  Pt. reports that she has been doing her exercises.  She has not had positional spinning, still primarily feels off balance. She reports that she had bilat knee joint injections and knees feel better. She reports some imporvement in confidence.       Pain: 2/10 (knees)     Objective  see flow sheet          Assessment  Pt. improved in overall stability, able to progress tap ups and gait tasks.  Better confidence with four square stepping.    Goals (to be met in 10 visits)   1.  Negative Amos-Hallpike and roll tests.  2.  Able to perform position changes such as supine to/from sit and bending over to the floor without dizziness to improve safety in functional tasks.  3.  Pt. Able to ambulate with horizontal head turns for visual scanning without path deviation or dizziness to improve safety during gait.                   Plan  sidestepping, obstacle negotiation    Treatment Last 4 Visits  Treatment Day: 4       4/16/2025 4/18/2025 5/1/2025 5/14/2025   Vestibular Treatment   Therapeutic Exercise  Bridges x 10  SLR x 10 ea  Sidelying hip abd x 10 ea  Bilat heel raises with UE support x 15    Strength assessment:   Hip abd, ext 3+/5  Hip flexion 4/5  Knee flex 3+/5  Knee ext 4-/5 (pain)  Dorsiflexion 5/5  Plantarflexion L: 2+/5;  R: 3-/5     Neuro Re-Education  Romberg EO: 30 sec inc sway;  EC: 30 sec very challenging  Standing feet apart, EC with slow weightshift   Four square step test: 15 sec= fall risk  Four square stepping L/R x 3 ea way  Tap ups x 10 ea LE on 8 in step  Standing eyes closed x 1 min with L/R, A/P  weightshifting    Discussed visual dependence and improving somatosensory feedback Standing trunk rotation x 10 ea way with visual focus  Four square stepping x 5 ea way with dec visual dependence  Standing EC with min weightshifting all dir x 3 ea  Tap ups with 3 cones x 5 ea LE       Therapeutic Activity  Five times sit to stand test:  16.8 sec       Gait Training  -- Functional Gait Assessment   Item Description Score (0 worst, 3 best)    ___2____1. Gait Level Surface-  Time: ____6.2______seconds  ___3____2. Change in gait speed  __2_____3. Gait with horizontal head turns   __2_____4. Gait with vertical head turns  __3_____5. Gait and pivot turn  __3_____6. Step over obstacle  ___0____7. Gait with narrow base of support-  # of steps_____0___  __1_____8. Gait with eyes closed-  Time: _____15_____seconds  ___1____9. Ambulating backward  ___1____10. Steps    TOTAL SCORE: __18_____/30    (less than 22/30 = fall risk)    Gait with horiz and vertical head turns 40 ft x 4  F/B gait with turns x 40 ft     F/B gait with turns 40 ft x 2  Gait with horiz head turns 40 ft x 2  Curb training up/down 8 in step x 5  Ramp training up/down x 5   Therapeutic Exercise Minutes  30     Neuro Re-Educ Minutes  10 15 25   Therapeutic Activity Minutes  5     Gait Training Minutes   25 15   Evaluation Minutes 40      Total Time Of Timed Procedures 0 45 40 40   Total Time Of Service-Based Procedures 40 0 0 0   Total Treatment Time 40 45 40 40   HEP Patient was issued written information regarding the pathology and management of BPPV.  No other home exercise program is appropriate at this time.   Access Code: [email or web address]  URL: [email or web address]  Date: 04/18/2025  Prepared by: Malka Tony    Exercises  - Supine Bridge  - 1 x daily - 7 x weekly - 2 sets - 10 reps  - Sidelying Hip Abduction  - 1 x daily - 7 x weekly - 10 reps  - Active Straight Leg Raise with Quad Set  - 1 x daily - 7 x weekly - 10 reps  - Heel Raises with  Counter Support  - 1 x daily - 7 x weekly - 2 sets - 10 reps  - Stand with eyes closed- grounding  - 1 x daily - 7 x weekly - 2 sets - 30 second hold Access Code: [email or web address]  URL: [email or web address]  Date: 05/01/2025  Prepared by: Malka Tony    Exercises  - Supine Bridge  - 1 x daily - 7 x weekly - 2 sets - 10 reps  - Sidelying Hip Abduction  - 1 x daily - 7 x weekly - 10 reps  - Active Straight Leg Raise with Quad Set  - 1 x daily - 7 x weekly - 10 reps  - Heel Raises with Counter Support  - 1 x daily - 7 x weekly - 2 sets - 10 reps  - Stand with eyes closed- grounding  - 1 x daily - 7 x weekly - 2 sets - 30 second hold  - Walking with Head Rotation  - 1 x daily - 7 x weekly - 2 sets - 5 reps  - Step Taps on Low Step  - 1 x daily - 7 x weekly - 10 reps Access Code: [email or web address]  URL: [email or web address]  Date: 05/14/2025  Prepared by: Malka Tony    Exercises  - Supine Bridge  - 1 x daily - 7 x weekly - 2 sets - 10 reps  - Sidelying Hip Abduction  - 1 x daily - 7 x weekly - 10 reps  - Active Straight Leg Raise with Quad Set  - 1 x daily - 7 x weekly - 10 reps  - Heel Raises with Counter Support  - 1 x daily - 7 x weekly - 2 sets - 10 reps  - Stand with eyes closed- grounding  - 1 x daily - 7 x weekly - 2 sets - 30 second hold  - Walking with Head Rotation  - 1 x daily - 7 x weekly - 2 sets - 5 reps  - Tap on 3 cones on the floor  - 1 x daily - 7 x weekly - 10 reps  - Forward and Backward Walking with Half Turns  - 1 x daily - 7 x weekly - 10 reps  - Turn head and shoulders left and right with visual focus  - 1 x daily - 7 x weekly - 5-10 reps          HEP  Access Code: [email or web address]  URL: [email or web address]  Date: 05/14/2025  Prepared by: Malka Tony    Exercises  - Supine Bridge  - 1 x daily - 7 x weekly - 2 sets - 10 reps  - Sidelying Hip Abduction  - 1 x daily - 7 x weekly - 10 reps  - Active Straight Leg Raise with Quad Set  - 1 x daily - 7 x weekly - 10  reps  - Heel Raises with Counter Support  - 1 x daily - 7 x weekly - 2 sets - 10 reps  - Stand with eyes closed- grounding  - 1 x daily - 7 x weekly - 2 sets - 30 second hold  - Walking with Head Rotation  - 1 x daily - 7 x weekly - 2 sets - 5 reps  - Tap on 3 cones on the floor  - 1 x daily - 7 x weekly - 10 reps  - Forward and Backward Walking with Half Turns  - 1 x daily - 7 x weekly - 10 reps  - Turn head and shoulders left and right with visual focus  - 1 x daily - 7 x weekly - 5-10 reps      Charges  NMR x 2, Gait x 1

## 2025-05-21 ENCOUNTER — OFFICE VISIT (OUTPATIENT)
Dept: PHYSICAL THERAPY | Facility: HOSPITAL | Age: 80
End: 2025-05-21
Attending: OTOLARYNGOLOGY
Payer: MEDICARE

## 2025-05-21 PROCEDURE — 97112 NEUROMUSCULAR REEDUCATION: CPT

## 2025-05-21 PROCEDURE — 97116 GAIT TRAINING THERAPY: CPT

## 2025-05-21 NOTE — PROGRESS NOTES
Patient: Ashley Chaudhari (79 year old, female) Referring Provider:  Insurance:   Diagnosis: BPPV right, gait instability Cordell Topete Malhotra MEDICARE   Date of Surgery: N/A Next MD visit:  FROILAN COMER   Precautions:  Fall Risk N/A Referral Information:    Date of Evaluation: Req: 0, Auth: 0, Exp:     No data recorded POC Auth Visits:  10       Today's Date   5/21/2025    Subjective  Pt. reports that she has been doing her exercises.  She has not had positional spinning, still primarily feels off balance. She reports some improvement in confidence.       Pain: pain not reported     Objective  see flow sheet          Assessment  Pt. improved in overall stability, able to progress tap ups and gait tasks.  Better confidence with four square stepping.    Goals (to be met in 10 visits)   1.  Negative Amos-Hallpike and roll tests.  2.  Able to perform position changes such as supine to/from sit and bending over to the floor without dizziness to improve safety in functional tasks.  3.  Pt. Able to ambulate with horizontal head turns for visual scanning without path deviation or dizziness to improve safety during gait.                       Plan  around obstacles, foam, rockerboard    Treatment Last 4 Visits  Treatment Day: 5       4/18/2025 5/1/2025 5/14/2025 5/21/2025   Vestibular Treatment   Therapeutic Exercise Bridges x 10  SLR x 10 ea  Sidelying hip abd x 10 ea  Bilat heel raises with UE support x 15    Strength assessment:   Hip abd, ext 3+/5  Hip flexion 4/5  Knee flex 3+/5  Knee ext 4-/5 (pain)  Dorsiflexion 5/5  Plantarflexion L: 2+/5;  R: 3-/5      Neuro Re-Education Romberg EO: 30 sec inc sway;  EC: 30 sec very challenging  Standing feet apart, EC with slow weightshift   Four square step test: 15 sec= fall risk  Four square stepping L/R x 3 ea way  Tap ups x 10 ea LE on 8 in step  Standing eyes closed x 1 min with L/R, A/P weightshifting    Discussed visual dependence and improving somatosensory feedback  Standing trunk rotation x 10 ea way with visual focus  Four square stepping x 5 ea way with dec visual dependence  Standing EC with min weightshifting all dir x 3 ea  Tap ups with 3 cones x 5 ea LE     Agility rings stepping F/B with one foot stationary x 10 ea  Tapping up on 3 cones x 10 ea LE, alternating  Rockerboard L/R with min A, no UE support  Sit to/from stand without UE support x 10 with emphasis on neutral knees over feet- dec adduction and propping  Standing step stance x 30 sec ea foot fwd       Therapeutic Activity Five times sit to stand test:  16.8 sec        Gait Training -- Functional Gait Assessment   Item Description Score (0 worst, 3 best)    ___2____1. Gait Level Surface-  Time: ____6.2______seconds  ___3____2. Change in gait speed  __2_____3. Gait with horizontal head turns   __2_____4. Gait with vertical head turns  __3_____5. Gait and pivot turn  __3_____6. Step over obstacle  ___0____7. Gait with narrow base of support-  # of steps_____0___  __1_____8. Gait with eyes closed-  Time: _____15_____seconds  ___1____9. Ambulating backward  ___1____10. Steps    TOTAL SCORE: __18_____/30    (less than 22/30 = fall risk)    Gait with horiz and vertical head turns 40 ft x 4  F/B gait with turns x 40 ft     F/B gait with turns 40 ft x 2  Gait with horiz head turns 40 ft x 2  Curb training up/down 8 in step x 5  Ramp training up/down x 5 Sidestepping over 6 in hurdles x 16 ea way  Stepping fwd over 6 in hurdles x 3 x 4 reps with reciprocal pattern  Encouraged decreased visual dependence   Therapeutic Exercise Minutes 30      Neuro Re-Educ Minutes 10 15 25 25   Therapeutic Activity Minutes 5      Gait Training Minutes  25 15 15   Total Time Of Timed Procedures 45 40 40 40   Total Time Of Service-Based Procedures 0 0 0 0   Total Treatment Time 45 40 40 40   HEP Access Code: [email or web address]  URL: [email or web address]  Date: 04/18/2025  Prepared by: Malka Terry  - Supine Bridge  -  1 x daily - 7 x weekly - 2 sets - 10 reps  - Sidelying Hip Abduction  - 1 x daily - 7 x weekly - 10 reps  - Active Straight Leg Raise with Quad Set  - 1 x daily - 7 x weekly - 10 reps  - Heel Raises with Counter Support  - 1 x daily - 7 x weekly - 2 sets - 10 reps  - Stand with eyes closed- grounding  - 1 x daily - 7 x weekly - 2 sets - 30 second hold Access Code: [email or web address]  URL: [email or web address]  Date: 05/01/2025  Prepared by: Malka Tony    Exercises  - Supine Bridge  - 1 x daily - 7 x weekly - 2 sets - 10 reps  - Sidelying Hip Abduction  - 1 x daily - 7 x weekly - 10 reps  - Active Straight Leg Raise with Quad Set  - 1 x daily - 7 x weekly - 10 reps  - Heel Raises with Counter Support  - 1 x daily - 7 x weekly - 2 sets - 10 reps  - Stand with eyes closed- grounding  - 1 x daily - 7 x weekly - 2 sets - 30 second hold  - Walking with Head Rotation  - 1 x daily - 7 x weekly - 2 sets - 5 reps  - Step Taps on Low Step  - 1 x daily - 7 x weekly - 10 reps Access Code: [email or web address]  URL: [email or web address]  Date: 05/14/2025  Prepared by: Malka Tony    Exercises  - Supine Bridge  - 1 x daily - 7 x weekly - 2 sets - 10 reps  - Sidelying Hip Abduction  - 1 x daily - 7 x weekly - 10 reps  - Active Straight Leg Raise with Quad Set  - 1 x daily - 7 x weekly - 10 reps  - Heel Raises with Counter Support  - 1 x daily - 7 x weekly - 2 sets - 10 reps  - Stand with eyes closed- grounding  - 1 x daily - 7 x weekly - 2 sets - 30 second hold  - Walking with Head Rotation  - 1 x daily - 7 x weekly - 2 sets - 5 reps  - Tap on 3 cones on the floor  - 1 x daily - 7 x weekly - 10 reps  - Forward and Backward Walking with Half Turns  - 1 x daily - 7 x weekly - 10 reps  - Turn head and shoulders left and right with visual focus  - 1 x daily - 7 x weekly - 5-10 reps   Access Code: [email or web address]  URL: [email or web address]  Date: 05/21/2025  Prepared by: Malka Terry  -  Supine Bridge  - 1 x daily - 7 x weekly - 2 sets - 10 reps  - Sidelying Hip Abduction  - 1 x daily - 7 x weekly - 10 reps  - Active Straight Leg Raise with Quad Set  - 1 x daily - 7 x weekly - 10 reps  - Heel Raises with Counter Support  - 1 x daily - 7 x weekly - 2 sets - 10 reps  - Stand with eyes closed- grounding  - 1 x daily - 7 x weekly - 2 sets - 30 second hold  - Walking with Head Rotation  - 1 x daily - 7 x weekly - 2 sets - 5 reps  - Tap on 3 cones on the floor  - 1 x daily - 7 x weekly - 10 reps  - Forward and Backward Walking with Half Turns  - 1 x daily - 7 x weekly - 10 reps  - Turn head and shoulders left and right with visual focus  - 1 x daily - 7 x weekly - 5-10 reps          HEP  Access Code: [email or web address]  URL: [email or web address]  Date: 05/21/2025  Prepared by: Malka Tony    Exercises  - Supine Bridge  - 1 x daily - 7 x weekly - 2 sets - 10 reps  - Sidelying Hip Abduction  - 1 x daily - 7 x weekly - 10 reps  - Active Straight Leg Raise with Quad Set  - 1 x daily - 7 x weekly - 10 reps  - Heel Raises with Counter Support  - 1 x daily - 7 x weekly - 2 sets - 10 reps  - Stand with eyes closed- grounding  - 1 x daily - 7 x weekly - 2 sets - 30 second hold  - Walking with Head Rotation  - 1 x daily - 7 x weekly - 2 sets - 5 reps  - Tap on 3 cones on the floor  - 1 x daily - 7 x weekly - 10 reps  - Forward and Backward Walking with Half Turns  - 1 x daily - 7 x weekly - 10 reps  - Turn head and shoulders left and right with visual focus  - 1 x daily - 7 x weekly - 5-10 reps      Charges  NMR x 2, Gait x 1

## 2025-05-28 ENCOUNTER — APPOINTMENT (OUTPATIENT)
Dept: PHYSICAL THERAPY | Facility: HOSPITAL | Age: 80
End: 2025-05-28
Attending: OTOLARYNGOLOGY
Payer: MEDICARE

## 2025-05-29 ENCOUNTER — APPOINTMENT (OUTPATIENT)
Dept: PHYSICAL THERAPY | Facility: HOSPITAL | Age: 80
End: 2025-05-29
Attending: OTOLARYNGOLOGY
Payer: MEDICARE

## 2025-05-29 ENCOUNTER — TELEPHONE (OUTPATIENT)
Dept: PHYSICAL THERAPY | Facility: HOSPITAL | Age: 80
End: 2025-05-29

## 2025-06-03 ENCOUNTER — OFFICE VISIT (OUTPATIENT)
Dept: PHYSICAL THERAPY | Facility: HOSPITAL | Age: 80
End: 2025-06-03
Attending: OTOLARYNGOLOGY
Payer: MEDICARE

## 2025-06-03 PROCEDURE — 97116 GAIT TRAINING THERAPY: CPT

## 2025-06-03 PROCEDURE — 97112 NEUROMUSCULAR REEDUCATION: CPT

## 2025-06-03 NOTE — PROGRESS NOTES
Patient: Ashley Chaudhari (79 year old, female) Referring Provider:  Insurance:   Diagnosis: BPPV right, gait instability Cordell Topete Malhotra MEDICARE   Date of Surgery: N/A Next MD visit:  FROILAN COMER   Precautions:  Fall Risk N/A Referral Information:    Date of Evaluation: Req: 0, Auth: 0, Exp:     No data recorded POC Auth Visits:  10       Today's Date   6/3/2025    Subjective  Pt. reports that she has been doing her exercises.  She has not had positional spinning, still primarily feels off balance. She reports some improvement in confidence  Pt. reports that her balance is still impaired, [place] with outside surfaces- had to hold onto  when walking in the city.       Pain: pain not reported     Objective  see flow sheet          Assessment  Pt. continues to improve in gait stability, although pt. not aware of progress as she still feels unstable with community gait.  Pt. does not walk outside much which is limiting her ability to generalize her progress to those situations.  Advised pt. to practice walking outside more in order to feel more stable in those environments.    Goals (to be met in 10 visits)   1.  Negative [place]-Hallpike and roll tests.  2.  Able to perform position changes such as supine to/from sit and bending over to the floor without dizziness to improve safety in functional tasks.  3.  Pt. Able to ambulate with horizontal head turns for visual scanning without path deviation or dizziness to improve safety during gait.                           Plan  Pt. to walk outside x 15 min daily.  Work on obstacle negotiation, rockerboard, foam in clinic.    Treatment Last 4 Visits  Treatment Day: 6       5/1/2025 5/14/2025 5/21/2025 6/3/2025   Vestibular Treatment   Neuro Re-Education Four square step test: 15 sec= fall risk  Four square stepping L/R x 3 ea way  Tap ups x 10 ea LE on 8 in step  Standing eyes closed x 1 min with L/R, A/P weightshifting    Discussed visual dependence and improving  somatosensory feedback Standing trunk rotation x 10 ea way with visual focus  Four square stepping x 5 ea way with dec visual dependence  Standing EC with min weightshifting all dir x 3 ea  Tap ups with 3 cones x 5 ea LE     Agility rings stepping F/B with one foot stationary x 10 ea  Tapping up on 3 cones x 10 ea LE, alternating  Rockerboard L/R with min A, no UE support  Sit to/from stand without UE support x 10 with emphasis on neutral knees over feet- dec adduction and propping  Standing step stance x 30 sec ea foot fwd     Eyes closed step stance static x 30 sec ea way  Reviewed and revised HEP   Gait Training Functional Gait Assessment   Item Description Score (0 worst, 3 best)    ___2____1. Gait Level Surface-  Time: ____6.2______seconds  ___3____2. Change in gait speed  __2_____3. Gait with horizontal head turns   __2_____4. Gait with vertical head turns  __3_____5. Gait and pivot turn  __3_____6. Step over obstacle  ___0____7. Gait with narrow base of support-  # of steps_____0___  __1_____8. Gait with eyes closed-  Time: _____15_____seconds  ___1____9. Ambulating backward  ___1____10. Steps    TOTAL SCORE: __18_____/30    (less than 22/30 = fall risk)    Gait with horiz and vertical head turns 40 ft x 4  F/B gait with turns x 40 ft     F/B gait with turns 40 ft x 2  Gait with horiz head turns 40 ft x 2  Curb training up/down 8 in step x 5  Ramp training up/down x 5 Sidestepping over 6 in hurdles x 16 ea way  Stepping fwd over 6 in hurdles x 3 x 4 reps with reciprocal pattern  Encouraged decreased visual dependence Gait Training outside:  Uneven sidewalk  Crossing street with visual scanning  Curbs  Grass, incline/decline  Stepping stones- uneven    Gait Training Inside:  Horiz and vert head turns  Fast/slow  Braiding L/R     Neuro Re-Educ Minutes 15 25 25 10   Gait Training Minutes 25 15 15 30   Total Time Of Timed Procedures 40 40 40 40   Total Time Of Service-Based Procedures 0 0 0 0   Total Treatment  Time 40 40 40 40   HEP Access Code: [email or web address]  URL: [email or web address]  Date: 05/01/2025  Prepared by: Malka Tony    Exercises  - Supine Bridge  - 1 x daily - 7 x weekly - 2 sets - 10 reps  - Sidelying Hip Abduction  - 1 x daily - 7 x weekly - 10 reps  - Active Straight Leg Raise with Quad Set  - 1 x daily - 7 x weekly - 10 reps  - Heel Raises with Counter Support  - 1 x daily - 7 x weekly - 2 sets - 10 reps  - Stand with eyes closed- grounding  - 1 x daily - 7 x weekly - 2 sets - 30 second hold  - Walking with Head Rotation  - 1 x daily - 7 x weekly - 2 sets - 5 reps  - Step Taps on Low Step  - 1 x daily - 7 x weekly - 10 reps Access Code: [email or web address]  URL: [email or web address]  Date: 05/14/2025  Prepared by: Malka Tony    Exercises  - Supine Bridge  - 1 x daily - 7 x weekly - 2 sets - 10 reps  - Sidelying Hip Abduction  - 1 x daily - 7 x weekly - 10 reps  - Active Straight Leg Raise with Quad Set  - 1 x daily - 7 x weekly - 10 reps  - Heel Raises with Counter Support  - 1 x daily - 7 x weekly - 2 sets - 10 reps  - Stand with eyes closed- grounding  - 1 x daily - 7 x weekly - 2 sets - 30 second hold  - Walking with Head Rotation  - 1 x daily - 7 x weekly - 2 sets - 5 reps  - Tap on 3 cones on the floor  - 1 x daily - 7 x weekly - 10 reps  - Forward and Backward Walking with Half Turns  - 1 x daily - 7 x weekly - 10 reps  - Turn head and shoulders left and right with visual focus  - 1 x daily - 7 x weekly - 5-10 reps   Access Code: [email or web address]  URL: [email or web address]  Date: 05/21/2025  Prepared by: Malka Tony    Exercises  - Supine Bridge  - 1 x daily - 7 x weekly - 2 sets - 10 reps  - Sidelying Hip Abduction  - 1 x daily - 7 x weekly - 10 reps  - Active Straight Leg Raise with Quad Set  - 1 x daily - 7 x weekly - 10 reps  - Heel Raises with Counter Support  - 1 x daily - 7 x weekly - 2 sets - 10 reps  - Stand with eyes closed- grounding  - 1 x daily -  7 x weekly - 2 sets - 30 second hold  - Walking with Head Rotation  - 1 x daily - 7 x weekly - 2 sets - 5 reps  - Tap on 3 cones on the floor  - 1 x daily - 7 x weekly - 10 reps  - Forward and Backward Walking with Half Turns  - 1 x daily - 7 x weekly - 10 reps  - Turn head and shoulders left and right with visual focus  - 1 x daily - 7 x weekly - 5-10 reps   Access Code: [email or web address]  URL: [email or web address]  Date: 06/03/2025  Prepared by: Malka Tony    Exercises  - Semi-tandem corner balance with eyes closed  - 1 x daily - 7 x weekly - 2 sets - 30 hold  - Heel Raises with Counter Support  - 1 x daily - 7 x weekly - 2 sets - 10 reps  - Walking with Head Rotation  - 1 x daily - 7 x weekly - 2 sets - 5 reps  - Forward and Backward Walking with Half Turns  - 1 x daily - 7 x weekly - 10 reps  - Tap on 3 cones on the floor  - 1 x daily - 7 x weekly - 10 reps  - Walking fast and slow  - 1 x daily - 7 x weekly - 2 sets - 1-2 minute hold          HEP  Access Code: [email or web address]  URL: [email or web address]  Date: 06/03/2025  Prepared by: Malka Tony    Exercises  - Semi-tandem corner balance with eyes closed  - 1 x daily - 7 x weekly - 2 sets - 30 hold  - Heel Raises with Counter Support  - 1 x daily - 7 x weekly - 2 sets - 10 reps  - Walking with Head Rotation  - 1 x daily - 7 x weekly - 2 sets - 5 reps  - Forward and Backward Walking with Half Turns  - 1 x daily - 7 x weekly - 10 reps  - Tap on 3 cones on the floor  - 1 x daily - 7 x weekly - 10 reps  - Walking fast and slow  - 1 x daily - 7 x weekly - 2 sets - 1-2 minute hold      Charges  Gait x 2, NMR x 1

## 2025-06-05 ENCOUNTER — OFFICE VISIT (OUTPATIENT)
Dept: FAMILY MEDICINE CLINIC | Facility: CLINIC | Age: 80
End: 2025-06-05
Payer: MEDICARE

## 2025-06-05 VITALS
WEIGHT: 153 LBS | DIASTOLIC BLOOD PRESSURE: 75 MMHG | OXYGEN SATURATION: 98 % | HEART RATE: 75 BPM | SYSTOLIC BLOOD PRESSURE: 118 MMHG | BODY MASS INDEX: 25 KG/M2

## 2025-06-05 DIAGNOSIS — R05.9 COUGH, UNSPECIFIED TYPE: ICD-10-CM

## 2025-06-05 DIAGNOSIS — E11.9 TYPE 2 DIABETES MELLITUS WITHOUT COMPLICATION, WITHOUT LONG-TERM CURRENT USE OF INSULIN (HCC): Primary | ICD-10-CM

## 2025-06-05 LAB
CREAT UR-SCNC: 103.6 MG/DL
HEMOGLOBIN A1C: 6.4 % (ref 4.3–5.6)
MICROALBUMIN UR-MCNC: 0.4 MG/DL
MICROALBUMIN/CREAT 24H UR-RTO: 3.9 UG/MG (ref ?–30)

## 2025-06-05 PROCEDURE — 83036 HEMOGLOBIN GLYCOSYLATED A1C: CPT | Performed by: FAMILY MEDICINE

## 2025-06-05 PROCEDURE — 99214 OFFICE O/P EST MOD 30 MIN: CPT | Performed by: FAMILY MEDICINE

## 2025-06-05 PROCEDURE — G2211 COMPLEX E/M VISIT ADD ON: HCPCS | Performed by: FAMILY MEDICINE

## 2025-06-05 RX ORDER — FLUTICASONE PROPIONATE 50 MCG
2 SPRAY, SUSPENSION (ML) NASAL DAILY
Qty: 1 EACH | Refills: 5 | Status: SHIPPED | OUTPATIENT
Start: 2025-06-05 | End: 2026-06-05

## 2025-06-05 RX ORDER — AZELASTINE 1 MG/ML
2 SPRAY, METERED NASAL 2 TIMES DAILY
Qty: 1 EACH | Refills: 3 | Status: SHIPPED | OUTPATIENT
Start: 2025-06-05

## 2025-06-05 RX ORDER — MONTELUKAST SODIUM 10 MG/1
10 TABLET ORAL DAILY
Qty: 90 TABLET | Refills: 0 | Status: SHIPPED | OUTPATIENT
Start: 2025-06-05 | End: 2026-05-31

## 2025-06-05 NOTE — PROGRESS NOTES
Subjective:   Ashley Chaudhari is a 79 year old female who presents for Follow - Up (6 month F/u on hypertension. ) and Cough (Pt C/o  consistently coughing all day and night.)       History/Other:   History of Present Illness  Ashley Chaudhari is a 79 year old female with diabetes who presents for a follow-up visit and evaluation of chronic cough.    She experiences a chronic cough for several months, with similar symptoms occurring two years ago. The cough is frequent throughout the day and night, often waking her and requiring water for relief. It is triggered by eating or drinking but also occurs spontaneously. Significant relief was previously achieved with acid blockers, and she is currently taking pantoprazole. She experiences nasal congestion and frequent coughing, especially at night and after eating or drinking. No shortness of breath is present.    Her diabetes is well-controlled with a recent A1c of 6.4. Blood tests are now performed in the office.    She experiences balance issues and is undergoing balance exercises with Malka Tony, though symptoms persist. She recently attended a wedding and required assistance from her partner, Mario, due to balance difficulties.      Chief Complaint Reviewed and Verified  Nursing Notes Reviewed and   Verified  Tobacco Reviewed  Allergies Reviewed  Medications Reviewed    Problem List Reviewed  OB Status Reviewed         Tobacco:  She has never smoked tobacco.    Current Medications[1]           Review of Systems:  See above      Objective:   /75   Pulse 75   Wt 153 lb (69.4 kg)   SpO2 98%   BMI 25.46 kg/m²    Estimated body mass index is 25.46 kg/m² as calculated from the following:    Height as of 12/3/24: 5' 5\" (1.651 m).    Weight as of this encounter: 153 lb (69.4 kg).  Results  RADIOLOGY  No results found.       Physical Exam  Physical Exam  GENERAL: Alert, well developed, well nourished, no acute distress.  HEENT: Nasal congestion.  NECK:  Without masses.  CHEST: Clear to auscultation bilaterally.  CARDIOVASCULAR: Regular rate and rhythm.  EXTREMITIES: No edema.      Assessment & Plan:   1. Type 2 diabetes mellitus without complication, without long-term current use of insulin (HCC) (Primary)  -     POC Hemoglobin A1C  -     Microalb/Creat Ratio, Random Urine; Future; Expected date: 06/05/2025  -     Microalb/Creat Ratio, Random Urine  2. Cough, unspecified type  -     POC Hemoglobin A1C  -     Microalb/Creat Ratio, Random Urine; Future; Expected date: 06/05/2025  -     Microalb/Creat Ratio, Random Urine  Other orders  -     Fluticasone Propionate; 2 sprays by Each Nare route daily.  Dispense: 1 each; Refill: 5  -     Azelastine HCl; 2 sprays by Nasal route 2 (two) times daily.  Dispense: 1 each; Refill: 3  -     Montelukast Sodium; Take 1 tablet (10 mg total) by mouth daily.  Dispense: 90 tablet; Refill: 0      Assessment & Plan  Assessment & Plan  Chronic Cough  Chronic cough for several months, exacerbated at night and after eating or drinking. Differential diagnosis includes postnasal drip, gastroesophageal reflux disease (GERD), and possible airway irritation due to allergies or asthma-like conditions. Previous improvement with acid blockers noted, but symptoms have recurred. No shortness of breath reported. Lungs clear on examination.  - Continue pantoprazole 40 mg oral daily for GERD management.  - Advise avoiding eating or drinking two hours before bedtime to reduce reflux symptoms.  - Recommend staying well hydrated during the day but limiting fluid intake at night.  - Prescribe Astelin nasal spray twice daily and Flonase nasal spray two puffs each nostril once daily for postnasal drip and allergy management.  - Add Singulair (montelukast) tablet once daily for asthma and allergy management.  - Reassess in two weeks; if no improvement, consider adding a cortisone inhaler, though cost is a concern.  - Consider chest X-ray if symptoms persist  beyond initial management.    Type II Diabetes Mellitus  Type II diabetes mellitus with recent A1c of 6.4, indicating good glycemic control.  - Continue current diabetes management regimen.  - Perform urine microalbumin test for diabetes management.    Intermittent Balance Issues  Intermittent balance issues previously assessed by ENT and physical therapy. No significant improvement reported, but exercises continue to be performed. She is advised to continue exercises to improve strength and reduce fall risk.  - Continue balance exercises as advised by physical therapist.  - Follow up with Dr. Wesley for further assessment.            Return in about 6 months (around 12/5/2025) for Routine physical, but call or message in 3 wks with cough update.      Elizabeth Thomas MD              [1]   Current Outpatient Medications   Medication Sig Dispense Refill    fluticasone propionate 50 MCG/ACT Nasal Suspension 2 sprays by Each Nare route daily. 1 each 5    azelastine 0.1 % Nasal Solution 2 sprays by Nasal route 2 (two) times daily. 1 each 3    montelukast (SINGULAIR) 10 MG Oral Tab Take 1 tablet (10 mg total) by mouth daily. 90 tablet 0    LEVOTHYROXINE 88 MCG Oral Tab TAKE 1 TABLET BY MOUTH BEFORE BREAKFAST. 90 tablet 3    ATORVASTATIN 80 MG Oral Tab TAKE 1 TABLET BY MOUTH EVERY DAY AT NIGHT 90 tablet 3    PANTOPRAZOLE 40 MG Oral Tab EC TAKE 1 TABLET BY MOUTH BEFORE BREAKFAST 90 tablet 3    LOSARTAN 25 MG Oral Tab TAKE 2 TABLETS BY MOUTH EVERY  tablet 3    escitalopram 20 MG Oral Tab Take 1 tablet (20 mg total) by mouth daily. 90 tablet 3    HYDROCHLOROTHIAZIDE 12.5 MG Oral Tab TAKE 1 TABLET BY MOUTH EVERY DAY 90 tablet 3    metFORMIN  MG Oral Tablet 24 Hr Take 1 tablet (500 mg total) by mouth daily with dinner. 90 tablet 3    Cholecalciferol (VITAMIN D) 2000 units Oral Cap Take 2,000 tablets by mouth.      aspirin 81 MG Oral Tab Take  by mouth.      Multiple Vitamins-Minerals (MULTIVITAL) Oral Chew Tab  Chew  by mouth.

## 2025-06-05 NOTE — PROGRESS NOTES
The following individual(s) verbally consented to be recorded using ambient AI listening technology and understand that they can each withdraw their consent to this listening technology at any point by asking the clinician to turn off or pause the recording:    Patient name: Ashley GARCES Watson  Additional names:

## 2025-06-10 ENCOUNTER — APPOINTMENT (OUTPATIENT)
Dept: PHYSICAL THERAPY | Facility: HOSPITAL | Age: 80
End: 2025-06-10
Attending: OTOLARYNGOLOGY
Payer: MEDICARE

## 2025-06-11 ENCOUNTER — OFFICE VISIT (OUTPATIENT)
Dept: OTOLARYNGOLOGY | Facility: CLINIC | Age: 80
End: 2025-06-11
Payer: MEDICARE

## 2025-06-11 ENCOUNTER — OFFICE VISIT (OUTPATIENT)
Dept: AUDIOLOGY | Facility: CLINIC | Age: 80
End: 2025-06-11

## 2025-06-11 DIAGNOSIS — R42 DIZZINESS: Primary | ICD-10-CM

## 2025-06-11 DIAGNOSIS — H90.3 SENSORINEURAL HEARING LOSS (SNHL), BILATERAL: Primary | ICD-10-CM

## 2025-06-11 DIAGNOSIS — H90.3 SENSORINEURAL HEARING LOSS, BILATERAL: ICD-10-CM

## 2025-06-11 PROCEDURE — 92567 TYMPANOMETRY: CPT | Performed by: AUDIOLOGIST

## 2025-06-11 PROCEDURE — 92557 COMPREHENSIVE HEARING TEST: CPT | Performed by: AUDIOLOGIST

## 2025-06-11 PROCEDURE — 99214 OFFICE O/P EST MOD 30 MIN: CPT | Performed by: OTOLARYNGOLOGY

## 2025-06-11 NOTE — PROGRESS NOTES
The following individual(s) verbally consented to be recorded using ambient AI listening technology and understand that they can each withdraw their consent to this listening technology at any point by asking the clinician to turn off or pause the recording:  Yes to consent  Patient name: Ashley Chaudhari

## 2025-06-17 ENCOUNTER — APPOINTMENT (OUTPATIENT)
Dept: PHYSICAL THERAPY | Facility: HOSPITAL | Age: 80
End: 2025-06-17
Attending: OTOLARYNGOLOGY
Payer: MEDICARE

## 2025-06-20 NOTE — PROGRESS NOTES
PROGRESS NOTE  OTOLOGY/OTOLARYNGOLOGY    REF MD:  No referring provider defined for this encounter.    PCP: Elizabeth Thomas MD    CHIEF COMPLAINT:    Chief Complaint   Patient presents with    Follow - Up     Reevaluation for BPPV, here to obtain audiogram        History of Present Illness  Ashley Chaudhari is a 79 year old female with normal pressure hydrocephalus who presents with episodes of dizziness and imbalance.    She has been experiencing episodes of dizziness and imbalance for a year and a half, with an increase in frequency over the past few months. Previously, she experienced sensations of being pushed back onto the bed and room spinning, but these symptoms have resolved. Currently, she experiences imbalance primarily when standing, not when sitting.    She underwent a shunt procedure approximately five to six years ago for normal pressure hydrocephalus, which was severe before the procedure. This condition has impacted her mobility.    She owns hearing aids but does not wear them consistently. She has had the hearing aids for a couple of years but has not had them adjusted recently.        PAST MEDICAL HISTORY:    Past Medical History:    Chronic right-sided low back pain without sciatica    Colon polyps    colonic polyps    Depression    Detached retina    Dr Villa    Diabetes (HCC)    Disorder of thyroid    Elevated LFTs    w/u Neg's    Graves' disease    s/p WALKER Miko hypothyroid    High blood pressure    High cholesterol    Hypothyroidism    s/p WALKER   Dr Crouch    L3-4 right mod far lateral, L4-5 mod central & right foraminal, L5-S1 right mild-mod far lateral bulging discs    L4-5 grade 2 unstable, L3-4 grade 1 unstable spondylolisthesis    L4-5 mod central, L3-4 right mild lateral recess stenosis    L5-S1 mild central HNP    left S1 radiculopathy    Microscopic hematuria    Migraine    Other and unspecified hyperlipidemia    hypercholesterol    Primary osteoarthritis of right knee: mild-mod  medial joint and patellar OA    Stroke (HCC)       PAST SURGICAL HISTORY:    Past Surgical History:   Procedure Laterality Date    Colonoscopy & polypectomy  05/29/2008    Egd  12/18/2023    with biopsy    Needle biopsy right      2014    Replace/revise brain shunt  01/2018       Medications Ordered Prior to Encounter[1]    Allergies: Allergies[2]    SOCIAL HISTORY:    Social History     Tobacco Use    Smoking status: Never    Smokeless tobacco: Never   Substance Use Topics    Alcohol use: Yes     Comment: on occasion        Family History   Problem Relation Age of Onset    Heart Surgery Father         CABG 70s    Cancer Father         biliary       REVIEW OF SYSTEMS:   PER HPI    EXAMINATION:  I washed my hands with an alcohol-based hand gel prior to examination  Constitutional:   --Vitals: There were no vitals taken for this visit.  General: no apparent distress, well-developed  Respiratory: No stridor, stertor or increased work of breathing  ENT:  --Ear: (bilateral ears were examined under binocular microscopy)  Right ear microscopic exam:  Pinna: Normal, no lesions or masses.  Mastoid: Nontender on palpation.   External auditory canal: Clear, no masses or lesions.  Tympanic membrane: Intact, no lesions, normal landmarks.  Middle ear: Aerated.    Left ear microscopic exam:  Pinna: Normal, no lesions or masses.  Mastoid: Nontender on palpation.   External auditory canal: Clear, no masses or lesions.  Tympanic membrane: Intact, no lesions, normal landmarks.  Middle ear: Aerated.    Vestibular examination (3/21/2024): Performed with infrared frenzel goggles  No neutral gaze nystagmus  No headshake nystagmus  Hallpike right: rotatory geotropic nystagmus, Hallpike left: negative  No aversion to OPK stimuli    Latest Audiogram Result (Hz) Exam performed: 6/11/2025 1:10 PM Last edited by Jo Silva Au.D on 6/11/2025 1:17 PM        125 250  1500 2000 3000 4000 6000 8000    Right air:  40 40  35 45 55  65   55    Left air:  45 45  40  50 60 70  60    Left mastoid bone:   35  35  45  65         Reliability:  Fair    Transducer:  Inserts    Technique:  Conventional Audiometry    Comments:            Latest Speech Audiometry  Last edited by Jo Silva Au.D on 6/11/2025 1:16 PM       Ear Method PTA SAT SRT [facility] Test/list Score (%) Intensity Mask/noise Notes    right live voice   50   10 By Difficulty 100 75  masked    left live voice   45   10 By Difficulty 96 70  masked                  Latest Tympanogram Result       Probe Tone (Hz): 226 Exam performed: 6/11/2025 1:10 PM Last edited by Jo Silva Au.D on 6/11/2025 1:14 PM      Tympanograms  These were drawn by a user, not generated from device data      Right Ear Left Ear                     Right Ear Left Ear    Tympanogram type: Type As Type A    Canal volume (mL): 1.3 1    Peak pressure (daPa): 24 7    Peak amplitude (mL): 0.15 1.46    Tympanogram width (daPa):        Comments:                    Latest Audiogram and Tympanogram Result Text  Last edited by Jo Silva Au.D on 6/11/2025  1:20 PM      Addendum      Patient wears hearing aids obtained at an outside facility.    Otoscopic Inspection:  both ears: no cerumen and TM visualized    Summary    SNHL bilaterally.      With this degree of hearing loss, patient would have difficulty hearing in most situations, and would not be able to hear most average conversational speech.       Follow up with Cordell Wesley M.D..  Audiological monitoring as needed during the course of medical management.             Addended by Jo Silva Au.D on 6/11/2025  1:20 PM                 ASSESSMENT/PLAN:  Ashley Chaudhari is a 79 year old female with     ICD-10-CM   1. Sensorineural hearing loss (SNHL), bilateral  H90.3      Assessment & Plan  Benign Paroxysmal Positional Vertigo (BPPV)  BPPV on the right side due to loose otoconia, exacerbated by a shunt. Vestibular migraine possible but BPPV primary.  Physical therapy expected to be 90% effective within four treatments.  - BPPV resolved, no further symptoms    Normal Pressure Hydrocephalus  Managed with functioning shunt. Imbalance not related to hydrocephalus.  - Continue follow-up with neurosurgery clinic for shunt management.    Age-related and noise-induced hearing loss  Mild to moderate sensorineural hearing loss across all frequencies due to age and noise exposure. Good clarity. Suitable for hearing aids, which she has but uses inconsistently.  - Provide copy of hearing test results for hearing aid adjustment.  - Advise hearing aid check and adjustment at [facility] if not recent.  - Encourage consistent hearing aid use.    Follow-up as needed  Situation reviewed with the patient in detail.    Cordell Wesley MD  Otology/Otolaryngology  [address] [place] [address]  Phone [phone]  Fax [phone]          [1]   Current Outpatient Medications on File Prior to Visit   Medication Sig Dispense Refill    fluticasone propionate 50 MCG/ACT Nasal Suspension 2 sprays by Each Nare route daily. 1 each 5    azelastine 0.1 % Nasal Solution 2 sprays by Nasal route 2 (two) times daily. 1 each 3    montelukast (SINGULAIR) 10 MG Oral Tab Take 1 tablet (10 mg total) by mouth daily. 90 tablet 0    LEVOTHYROXINE 88 MCG Oral Tab TAKE 1 TABLET BY MOUTH BEFORE BREAKFAST. 90 tablet 3    ATORVASTATIN 80 MG Oral Tab TAKE 1 TABLET BY MOUTH EVERY DAY AT NIGHT 90 tablet 3    PANTOPRAZOLE 40 MG Oral Tab EC TAKE 1 TABLET BY MOUTH BEFORE BREAKFAST 90 tablet 3    LOSARTAN 25 MG Oral Tab TAKE 2 TABLETS BY MOUTH EVERY  tablet 3    escitalopram 20 MG Oral Tab Take 1 tablet (20 mg total) by mouth daily. 90 tablet 3    HYDROCHLOROTHIAZIDE 12.5 MG Oral Tab TAKE 1 TABLET BY MOUTH EVERY DAY 90 tablet 3    metFORMIN  MG Oral Tablet 24 Hr Take 1 tablet (500 mg total) by mouth daily with dinner. 90 tablet 3     Cholecalciferol (VITAMIN D) 2000 units Oral Cap Take 2,000 tablets by mouth.      aspirin 81 MG Oral Tab Take  by mouth.      Multiple Vitamins-Minerals (MULTIVITAL) Oral Chew Tab Chew  by mouth.       No current facility-administered medications on file prior to visit.   [2]   Allergies  Allergen Reactions    Cat Hair Extract UNKNOWN and HIVES

## 2025-06-24 ENCOUNTER — APPOINTMENT (OUTPATIENT)
Dept: PHYSICAL THERAPY | Facility: HOSPITAL | Age: 80
End: 2025-06-24
Attending: OTOLARYNGOLOGY
Payer: MEDICARE

## 2025-07-24 RX ORDER — HYDROCHLOROTHIAZIDE 12.5 MG/1
12.5 TABLET ORAL DAILY
Qty: 90 TABLET | Refills: 3 | Status: SHIPPED | OUTPATIENT
Start: 2025-07-24

## 2025-07-24 NOTE — TELEPHONE ENCOUNTER
Refill Per Protocol     Requested Prescriptions   Pending Prescriptions Disp Refills    HYDROCHLOROTHIAZIDE 12.5 MG Oral Tab [Pharmacy Med Name: HYDROCHLOROTHIAZIDE 12.5 MG TB] 90 tablet 3     Sig: TAKE 1 TABLET BY MOUTH EVERY DAY       Hypertension Medications Protocol Passed - 7/24/2025  2:05 PM        Passed - CMP or BMP in past 12 months        Passed - Last BP reading less than 140/90     BP Readings from Last 1 Encounters:   06/05/25 118/75               Passed - In person appointment or virtual visit in the past 12 mos or appointment in next 3 mos     Recent Outpatient Visits              1 month ago Dizziness    [facility] Jo Silva Au.D    Office Visit    1 month ago Sensorineural hearing loss (SNHL), bilateral    [facility] Cordell Major MD    Office Visit    1 month ago Type 2 diabetes mellitus without complication, without long-term current use of insulin (HCC)    [facility] Elizabeth Thomas MD    Office Visit    1 month ago     [facility] Malka Tony, PT    Office Visit    2 months ago     [facility] Malka Tony, PT    Office Visit          Future Appointments         Provider Department Appt Notes    In 4 months Elizabeth Thomas MD [facility] annual Medicare wellness exam                    Passed - EGFRCR or GFRNAA > 50     GFR Evaluation  EGFRCR: 83 , resulted on 11/26/2024          Passed - Medication is active on med list

## 2025-08-07 ENCOUNTER — PATIENT MESSAGE (OUTPATIENT)
Dept: FAMILY MEDICINE CLINIC | Facility: CLINIC | Age: 80
End: 2025-08-07

## 2025-08-07 DIAGNOSIS — E11.9 TYPE 2 DIABETES MELLITUS WITHOUT COMPLICATION, WITHOUT LONG-TERM CURRENT USE OF INSULIN (HCC): Primary | ICD-10-CM

## 2025-08-13 ENCOUNTER — OFFICE VISIT (OUTPATIENT)
Dept: FAMILY MEDICINE CLINIC | Facility: CLINIC | Age: 80
End: 2025-08-13

## 2025-08-13 VITALS
HEART RATE: 83 BPM | DIASTOLIC BLOOD PRESSURE: 62 MMHG | HEIGHT: 65 IN | WEIGHT: 146 LBS | SYSTOLIC BLOOD PRESSURE: 101 MMHG | OXYGEN SATURATION: 99 % | BODY MASS INDEX: 24.32 KG/M2

## 2025-08-13 DIAGNOSIS — E83.52 HYPERCALCEMIA: ICD-10-CM

## 2025-08-13 DIAGNOSIS — R03.1 LOW BLOOD PRESSURE READING: ICD-10-CM

## 2025-08-13 DIAGNOSIS — E11.9 TYPE 2 DIABETES MELLITUS WITHOUT COMPLICATION, WITHOUT LONG-TERM CURRENT USE OF INSULIN (HCC): ICD-10-CM

## 2025-08-13 DIAGNOSIS — I63.40 CEREBROVASCULAR ACCIDENT (CVA) DUE TO EMBOLISM OF CEREBRAL ARTERY (HCC): ICD-10-CM

## 2025-08-13 DIAGNOSIS — S06.5XAA SUBDURAL HEMATOMA (HCC): Primary | ICD-10-CM

## 2025-08-13 LAB
ANION GAP SERPL CALC-SCNC: 8 MMOL/L (ref 0–18)
BASOPHILS # BLD AUTO: 0.05 X10(3) UL (ref 0–0.2)
BASOPHILS NFR BLD AUTO: 1 %
BILIRUB UR QL: NEGATIVE
BUN BLD-MCNC: 19 MG/DL (ref 9–23)
BUN/CREAT SERPL: 24.7 (ref 10–20)
CALCIUM BLD-MCNC: 10.4 MG/DL (ref 8.7–10.4)
CHLORIDE SERPL-SCNC: 110 MMOL/L (ref 98–112)
CO2 SERPL-SCNC: 25 MMOL/L (ref 21–32)
CREAT BLD-MCNC: 0.77 MG/DL (ref 0.55–1.02)
DEPRECATED RDW RBC AUTO: 48.5 FL (ref 35.1–46.3)
EGFRCR SERPLBLD CKD-EPI 2021: 78 ML/MIN/1.73M2 (ref 60–?)
EOSINOPHIL # BLD AUTO: 0.09 X10(3) UL (ref 0–0.7)
EOSINOPHIL NFR BLD AUTO: 1.8 %
ERYTHROCYTE [DISTWIDTH] IN BLOOD BY AUTOMATED COUNT: 13.6 % (ref 11–15)
FASTING STATUS PATIENT QL REPORTED: NO
GLUCOSE BLD-MCNC: 180 MG/DL (ref 70–99)
GLUCOSE BLOOD: 231
GLUCOSE UR-MCNC: 30 MG/DL
HCT VFR BLD AUTO: 36.3 % (ref 35–48)
HGB BLD-MCNC: 11.8 G/DL (ref 12–16)
HGB UR QL STRIP.AUTO: NEGATIVE
IMM GRANULOCYTES # BLD AUTO: 0.02 X10(3) UL (ref 0–1)
IMM GRANULOCYTES NFR BLD: 0.4 %
KETONES UR-MCNC: NEGATIVE MG/DL
LEUKOCYTE ESTERASE UR QL STRIP.AUTO: 500
LYMPHOCYTES # BLD AUTO: 1.2 X10(3) UL (ref 1–4)
LYMPHOCYTES NFR BLD AUTO: 23.8 %
MCH RBC QN AUTO: 31.6 PG (ref 26–34)
MCHC RBC AUTO-ENTMCNC: 32.5 G/DL (ref 31–37)
MCV RBC AUTO: 97.1 FL (ref 80–100)
MONOCYTES # BLD AUTO: 0.5 X10(3) UL (ref 0.1–1)
MONOCYTES NFR BLD AUTO: 9.9 %
NEUTROPHILS # BLD AUTO: 3.18 X10 (3) UL (ref 1.5–7.7)
NEUTROPHILS # BLD AUTO: 3.18 X10(3) UL (ref 1.5–7.7)
NEUTROPHILS NFR BLD AUTO: 63.1 %
NITRITE UR QL STRIP.AUTO: NEGATIVE
OSMOLALITY SERPL CALC.SUM OF ELEC: 303 MOSM/KG (ref 275–295)
PH UR: 5.5 (ref 5–8)
PLATELET # BLD AUTO: 215 10(3)UL (ref 150–450)
POTASSIUM SERPL-SCNC: 3.5 MMOL/L (ref 3.5–5.1)
PROT UR-MCNC: 20 MG/DL
PTH-INTACT SERPL-MCNC: 38.3 PG/ML (ref 18.5–88)
RBC # BLD AUTO: 3.74 X10(6)UL (ref 3.8–5.3)
SODIUM SERPL-SCNC: 143 MMOL/L (ref 136–145)
SP GR UR STRIP: 1.03 (ref 1–1.03)
TEST STRIP LOT #: NORMAL NUMERIC
UROBILINOGEN UR STRIP-ACNC: NORMAL
WBC # BLD AUTO: 5 X10(3) UL (ref 4–11)

## 2025-08-13 PROCEDURE — 82962 GLUCOSE BLOOD TEST: CPT | Performed by: FAMILY MEDICINE

## 2025-08-13 PROCEDURE — G2211 COMPLEX E/M VISIT ADD ON: HCPCS | Performed by: FAMILY MEDICINE

## 2025-08-13 PROCEDURE — 99214 OFFICE O/P EST MOD 30 MIN: CPT | Performed by: FAMILY MEDICINE

## 2025-08-13 RX ORDER — BETHANECHOL CHLORIDE 50 MG/1
50 TABLET ORAL AS DIRECTED
COMMUNITY
Start: 2025-08-05 | End: 2025-09-04

## 2025-08-13 RX ORDER — ACETAMINOPHEN 325 MG/1
650 TABLET ORAL AS DIRECTED
COMMUNITY
Start: 2025-08-05 | End: 2025-09-04

## 2025-08-18 ENCOUNTER — PATIENT MESSAGE (OUTPATIENT)
Dept: FAMILY MEDICINE CLINIC | Facility: CLINIC | Age: 80
End: 2025-08-18

## 2025-08-23 ENCOUNTER — PATIENT MESSAGE (OUTPATIENT)
Dept: FAMILY MEDICINE CLINIC | Facility: CLINIC | Age: 80
End: 2025-08-23

## 2025-08-27 ENCOUNTER — TELEPHONE (OUTPATIENT)
Dept: FAMILY MEDICINE CLINIC | Facility: CLINIC | Age: 80
End: 2025-08-27

## 2025-08-27 RX ORDER — GLIPIZIDE 2.5 MG/1
2.5 TABLET ORAL
Qty: 30 TABLET | Refills: 0 | Status: SHIPPED | OUTPATIENT
Start: 2025-08-27 | End: 2025-08-28

## 2025-08-28 RX ORDER — GLIPIZIDE 2.5 MG/1
2.5 TABLET, EXTENDED RELEASE ORAL
Qty: 90 TABLET | Refills: 3 | Status: SHIPPED | OUTPATIENT
Start: 2025-08-28

## (undated) DEVICE — CONMED SCOPE SAVER BITE BLOCK, 20X27 MM: Brand: SCOPE SAVER

## (undated) DEVICE — MEDI-VAC NON-CONDUCTIVE SUCTION TUBING 6MM X 1.8M (6FT.) L: Brand: CARDINAL HEALTH

## (undated) DEVICE — KIT ENDO ORCAPOD 160/180/190

## (undated) DEVICE — KIT CLEAN ENDOKIT 1.1OZ GOWNX2

## (undated) DEVICE — Device: Brand: DUAL NARE NASAL CANNULAE FEMALE LUER CON 7FT O2 TUBE

## (undated) DEVICE — 60 ML SYRINGE REGULAR TIP: Brand: MONOJECT

## (undated) DEVICE — YANKAUER SUCTION INSTRUMENT NO CONTROL VENT, BULB TIP, CLEAR: Brand: YANKAUER

## (undated) DEVICE — GIJAW SINGLE-USE BIOPSY FORCEPS WITH NEEDLE: Brand: GIJAW

## (undated) DEVICE — MEDI-VAC NON-CONDUCTIVE SUCTION TUBING: Brand: CARDINAL HEALTH

## (undated) NOTE — LETTER
[facility] SURGERY SCHEDULING FORM   [address] [place] [address] [place] [address]   [phone] (scheduling phone) [phone] (scheduling fax)     PATIENT INFORMATION   Last Name:      Rashmi Maria      First Name:    Velasquez Ayala []  No or using our own   Allergies: Cat Hair Extract         Completed by:    Carey Hu      Date:    9/5/2018

## (undated) NOTE — LETTER
11/2/2017              Lamont Douglas        [address]        [facility] [place] [address]         Neurology  Gait abnormality  Branden Rai MD  [facility]  [address] [place] [address]  [address] [place] [address]  [phone]

## (undated) NOTE — LETTER
[address]  Authorization for Surgical Operation and Procedure                                                                                           I hereby authorize Roscoe Davis MD, my physician and his/her assistants (if applicable), which may include medical students, residents, and/or fellows, to perform the following surgical operation/ procedure and administer such anesthesia as may be determined necessary by my physician: Operation/Procedure name (s) ESOPHAGOGASTRODUODENOSCOPY on [address]   2. I recognize that during the surgical operation/procedure, unforeseen conditions may necessitate additional or different procedures than those listed above. I, therefore, further authorize and request that the above-named surgeon, assistants, or designees perform such procedures as are, in their judgment, necessary and desirable. 3.   My surgeon/physician has discussed prior to my surgery the potential benefits, risks and side effects of this procedure; the likelihood of achieving goals; and potential problems that might occur during recuperation. They also discussed reasonable alternatives to the procedure, including risks, benefits, and side effects related to the alternatives and risks related to not receiving this procedure. I have had all my questions answered and I acknowledge that no guarantee has been made as to the result that may be obtained. 4.   Should the need arise during my operation/procedure, which includes change of level of care prior to discharge, I also consent to the administration of blood and/or blood products. Further, I understand that despite careful testing and screening of blood or blood products by collecting agencies, I may still be subject to ill effects as a result of receiving a blood transfusion and/or blood products.   The following are some, but not all, of the potential risks that can occur: fever and allergic reactions, hemolytic reactions, transmission of diseases such as Hepatitis, AIDS and Cytomegalovirus (CMV) and fluid overload. In the event that I wish to have an autologous transfusion of my own blood, or a directed donor transfusion, I will discuss this with my physician. Check only if Refusing Blood or Blood Products  I understand refusal of blood or blood products as deemed necessary by my physician may have serious consequences to my condition to include possible death. I hereby assume responsibility for my refusal and release the hospital, its personnel, and my physicians from any responsibility for the consequences of my refusal.    o  Refuse   5. I authorize the use of any specimen, organs, tissues, body parts or foreign objects that may be removed from my body during the operation/procedure for diagnosis, research or teaching purposes and their subsequent disposal by hospital authorities. I also authorize the release of specimen test results and/or written reports to my treating physician on the hospital medical staff or other referring or consulting physicians involved in my care, at the discretion of the Pathologist or my treating physician. 6.   I consent to the photographing or videotaping of the operations or procedures to be performed, including appropriate portions of my body for medical, scientific, or educational purposes, provided my identity is not revealed by the pictures or by descriptive texts accompanying them. If the procedure has been photographed/videotaped, the surgeon will obtain the original picture, image, videotape or CD. The hospital will not be responsible for storage, release or maintenance of the picture, image, tape or CD.    7.   I consent to the presence of a  or observers in the operating room as deemed necessary by my physician or their designees.     8.   I recognize that in the event my procedure results in extended X-Ray/fluoroscopy time, I may develop a skin reaction. 9. If I have a Do Not Attempt Resuscitation (DNAR) order in place, that status will be suspended while in the operating room, procedural suite, and during the recovery period unless otherwise explicitly stated by me (or a person authorized to consent on my behalf). The surgeon or my attending physician will determine when the applicable recovery period ends for purposes of reinstating the DNAR order. 10. Patients having a sterilization procedure: I understand that if the procedure is successful the results will be permanent and it will therefore be impossible for me to inseminate, conceive, or bear children. I also understand that the procedure is intended to result in sterility, although the result has not been guaranteed. 11. I acknowledge that my physician has explained sedation/analgesia administration to me including the risk and benefits I consent to the administration of sedation/analgesia as may be necessary or desirable in the judgment of my physician. I CERTIFY THAT I HAVE READ AND FULLY UNDERSTAND THE ABOVE CONSENT TO OPERATION and/or OTHER PROCEDURE.     _________________________________________ _________________________________     ___________________________________  Signature of Patient     Signature of Responsible Person                   Printed Name of Responsible Person                              _________________________________________ ______________________________        ___________________________________  Signature of Witness         Date  Time         Relationship to Patient    STATEMENT OF PHYSICIAN My signature below affirms that prior to the time of the procedure; I have explained to the patient and/or his/her legal representative, the risks and benefits involved in the proposed treatment and any reasonable alternative to the proposed treatment.  I have also explained the risks and benefits involved in refusal of the proposed treatment and alternatives to the proposed treatment and have answered the patient's questions.  If I have a significant financial interest in a co-management agreement or a significant financial interest in any product or implant, or other significant relationship used in this procedure/surgery, I have disclosed this and had a discussion with my patient.     _______________________________________________________________ _____________________________  Cynthia Haines Physician)                                                                                         (Date)                                   (Time)  Patient Name: Garrick Moreno    : 1945   Printed: 2023      Medical Record #: [id number]                                              Page 1 of 1

## (undated) NOTE — LETTER
7/31/2023    Giulia Mendoza        [address]        [place] [facility] [address]            Dear Giulia eMndoza,      Our records indicate that you are due for an appointment for an EGD or Upper Endoscopy with Heriberto Lemus MD. Our doctors are booking out about 3-5 months in advance for procedures. Please call our office to schedule a phone screening appointment to plan for the procedure(s). Your medical well-being is important to us. If your insurance requires a referral, please call your primary care office to request one.       Thank you,      The Physicians and Staff at [facility]

## (undated) NOTE — LETTER
9/21/2018      Shawna Knight MD  Physical Medicine and Rehabilitation  [address] [facility]  Dept: [phone]  Dept Fax: [phone]        RE: Consultation for Jacklyn Correa        Dear [facility].  Abhinav Shirley MD,    Thank y

## (undated) NOTE — MR AVS SNAPSHOT
[facility]  [address], [address]  [phone]               Thank you for choosing us for your health care visit with Viki Peoples MD.  We are glad to serve you and happy to provide you with this summary Summaries. If you've been to the Emergency Department or your doctor's office, you can view your past visit information in [facility] by going to Visits < Visit Summaries. [facility] questions? Call [phone] for help.   [facility] is NOT to be used for urge walking, light jogging, cycling, swimming, etc.) for a goal of at least 150 minutes per week. Moderation of alcohol consumption Men: limit to <= 2 drinks* per day. Women and lighter weight persons: limit to <= 1 drink* per day.

## (undated) NOTE — LETTER
AUTHORIZATION FOR SURGICAL OPERATION OR OTHER PROCEDURE    1.  I hereby [facility] Dr. Del Lane , and [facility] staff assigned to my case to perform the following operation and/or procedure at the [facility]:    ___________________________ Patient Name:  ______________________________________________________  (please print)      Patient signature:  ___________________________________________________             Relationship to Patient:           []  Parent    Responsible person